# Patient Record
Sex: MALE | Race: WHITE | NOT HISPANIC OR LATINO | Employment: FULL TIME | URBAN - METROPOLITAN AREA
[De-identification: names, ages, dates, MRNs, and addresses within clinical notes are randomized per-mention and may not be internally consistent; named-entity substitution may affect disease eponyms.]

---

## 2017-03-03 ENCOUNTER — APPOINTMENT (EMERGENCY)
Dept: CT IMAGING | Facility: HOSPITAL | Age: 42
End: 2017-03-03
Payer: COMMERCIAL

## 2017-03-03 ENCOUNTER — HOSPITAL ENCOUNTER (EMERGENCY)
Facility: HOSPITAL | Age: 42
Discharge: HOME | End: 2017-03-03
Attending: EMERGENCY MEDICINE | Admitting: EMERGENCY MEDICINE
Payer: COMMERCIAL

## 2017-03-03 VITALS
HEART RATE: 64 BPM | WEIGHT: 278.44 LBS | TEMPERATURE: 98.2 F | OXYGEN SATURATION: 97 % | SYSTOLIC BLOOD PRESSURE: 143 MMHG | RESPIRATION RATE: 16 BRPM | DIASTOLIC BLOOD PRESSURE: 68 MMHG

## 2017-03-03 DIAGNOSIS — K52.9 ACUTE GASTROENTERITIS: Primary | ICD-10-CM

## 2017-03-03 LAB
ALBUMIN SERPL BCP-MCNC: 4.2 G/DL (ref 3.5–5)
ALP SERPL-CCNC: 73 U/L (ref 46–116)
ALT SERPL W P-5'-P-CCNC: 39 U/L (ref 12–78)
ANION GAP SERPL CALCULATED.3IONS-SCNC: 9 MMOL/L (ref 4–13)
AST SERPL W P-5'-P-CCNC: 14 U/L (ref 5–45)
BASOPHILS # BLD AUTO: 0.02 THOUSANDS/ΜL (ref 0–0.1)
BASOPHILS NFR BLD AUTO: 0 % (ref 0–1)
BILIRUB SERPL-MCNC: 0.9 MG/DL (ref 0.2–1)
BUN SERPL-MCNC: 9 MG/DL (ref 5–25)
CALCIUM SERPL-MCNC: 8.8 MG/DL (ref 8.3–10.1)
CHLORIDE SERPL-SCNC: 103 MMOL/L (ref 100–108)
CO2 SERPL-SCNC: 29 MMOL/L (ref 21–32)
CREAT SERPL-MCNC: 0.84 MG/DL (ref 0.6–1.3)
EOSINOPHIL # BLD AUTO: 0.05 THOUSAND/ΜL (ref 0–0.61)
EOSINOPHIL NFR BLD AUTO: 1 % (ref 0–6)
ERYTHROCYTE [DISTWIDTH] IN BLOOD BY AUTOMATED COUNT: 13.1 % (ref 11.6–15.1)
GFR SERPL CREATININE-BSD FRML MDRD: >60 ML/MIN/1.73SQ M
GLUCOSE SERPL-MCNC: 96 MG/DL (ref 65–140)
HCT VFR BLD AUTO: 43 % (ref 36.5–49.3)
HGB BLD-MCNC: 15 G/DL (ref 12–17)
LACTATE SERPL-SCNC: 0.8 MMOL/L (ref 0.5–2)
LIPASE SERPL-CCNC: 154 U/L (ref 73–393)
LYMPHOCYTES # BLD AUTO: 1.79 THOUSANDS/ΜL (ref 0.6–4.47)
LYMPHOCYTES NFR BLD AUTO: 27 % (ref 14–44)
MCH RBC QN AUTO: 30.1 PG (ref 26.8–34.3)
MCHC RBC AUTO-ENTMCNC: 34.9 G/DL (ref 31.4–37.4)
MCV RBC AUTO: 86 FL (ref 82–98)
MONOCYTES # BLD AUTO: 0.65 THOUSAND/ΜL (ref 0.17–1.22)
MONOCYTES NFR BLD AUTO: 10 % (ref 4–12)
NEUTROPHILS # BLD AUTO: 4.07 THOUSANDS/ΜL (ref 1.85–7.62)
NEUTS SEG NFR BLD AUTO: 62 % (ref 43–75)
PLATELET # BLD AUTO: 188 THOUSANDS/UL (ref 149–390)
PMV BLD AUTO: 10.5 FL (ref 8.9–12.7)
POTASSIUM SERPL-SCNC: 3.6 MMOL/L (ref 3.5–5.3)
PROT SERPL-MCNC: 7.3 G/DL (ref 6.4–8.2)
RBC # BLD AUTO: 4.99 MILLION/UL (ref 3.88–5.62)
SODIUM SERPL-SCNC: 141 MMOL/L (ref 136–145)
WBC # BLD AUTO: 6.58 THOUSAND/UL (ref 4.31–10.16)

## 2017-03-03 PROCEDURE — 74177 CT ABD & PELVIS W/CONTRAST: CPT

## 2017-03-03 PROCEDURE — 80053 COMPREHEN METABOLIC PANEL: CPT | Performed by: EMERGENCY MEDICINE

## 2017-03-03 PROCEDURE — 36415 COLL VENOUS BLD VENIPUNCTURE: CPT | Performed by: EMERGENCY MEDICINE

## 2017-03-03 PROCEDURE — 83605 ASSAY OF LACTIC ACID: CPT | Performed by: EMERGENCY MEDICINE

## 2017-03-03 PROCEDURE — 96374 THER/PROPH/DIAG INJ IV PUSH: CPT

## 2017-03-03 PROCEDURE — 96361 HYDRATE IV INFUSION ADD-ON: CPT

## 2017-03-03 PROCEDURE — 83690 ASSAY OF LIPASE: CPT | Performed by: EMERGENCY MEDICINE

## 2017-03-03 PROCEDURE — 85025 COMPLETE CBC W/AUTO DIFF WBC: CPT | Performed by: EMERGENCY MEDICINE

## 2017-03-03 PROCEDURE — 99284 EMERGENCY DEPT VISIT MOD MDM: CPT

## 2017-03-03 RX ORDER — CIPROFLOXACIN 500 MG/1
500 TABLET, FILM COATED ORAL 2 TIMES DAILY
Qty: 10 TABLET | Refills: 0 | Status: SHIPPED | OUTPATIENT
Start: 2017-03-03 | End: 2017-03-08

## 2017-03-03 RX ORDER — ONDANSETRON 4 MG/1
4 TABLET, ORALLY DISINTEGRATING ORAL EVERY 6 HOURS PRN
Qty: 15 TABLET | Refills: 0 | Status: ON HOLD | OUTPATIENT
Start: 2017-03-03 | End: 2019-02-23

## 2017-03-03 RX ORDER — ONDANSETRON 2 MG/ML
4 INJECTION INTRAMUSCULAR; INTRAVENOUS ONCE
Status: COMPLETED | OUTPATIENT
Start: 2017-03-03 | End: 2017-03-03

## 2017-03-03 RX ADMIN — SODIUM CHLORIDE 1000 ML: 0.9 INJECTION, SOLUTION INTRAVENOUS at 16:44

## 2017-03-03 RX ADMIN — ONDANSETRON 4 MG: 2 INJECTION INTRAMUSCULAR; INTRAVENOUS at 16:46

## 2017-03-03 RX ADMIN — IOHEXOL 100 ML: 350 INJECTION, SOLUTION INTRAVENOUS at 17:27

## 2019-02-23 ENCOUNTER — APPOINTMENT (EMERGENCY)
Dept: RADIOLOGY | Facility: HOSPITAL | Age: 44
End: 2019-02-23
Payer: COMMERCIAL

## 2019-02-23 ENCOUNTER — HOSPITAL ENCOUNTER (OUTPATIENT)
Facility: HOSPITAL | Age: 44
Setting detail: OBSERVATION
Discharge: HOME | End: 2019-02-24
Attending: EMERGENCY MEDICINE | Admitting: INTERNAL MEDICINE
Payer: COMMERCIAL

## 2019-02-23 DIAGNOSIS — R06.00 DYSPNEA ON EXERTION: ICD-10-CM

## 2019-02-23 DIAGNOSIS — R07.9 CHEST PAIN, UNSPECIFIED TYPE: Primary | ICD-10-CM

## 2019-02-23 DIAGNOSIS — R07.89 OTHER CHEST PAIN: ICD-10-CM

## 2019-02-23 PROBLEM — E78.5 DYSLIPIDEMIA: Status: ACTIVE | Noted: 2019-02-23

## 2019-02-23 PROBLEM — G89.29 CHRONIC BACK PAIN: Status: ACTIVE | Noted: 2019-02-23

## 2019-02-23 PROBLEM — M54.9 CHRONIC BACK PAIN: Status: ACTIVE | Noted: 2019-02-23

## 2019-02-23 PROBLEM — I10 ESSENTIAL HYPERTENSION: Status: ACTIVE | Noted: 2019-02-23

## 2019-02-23 LAB
ALBUMIN SERPL BCP-MCNC: 4.2 G/DL (ref 3.5–5)
ALP SERPL-CCNC: 47 U/L (ref 46–116)
ALT SERPL W P-5'-P-CCNC: 41 U/L (ref 12–78)
ANION GAP SERPL CALCULATED.3IONS-SCNC: 9 MMOL/L (ref 4–13)
AST SERPL W P-5'-P-CCNC: 11 U/L (ref 5–45)
BASOPHILS # BLD AUTO: 0.04 THOUSANDS/ΜL (ref 0–0.1)
BASOPHILS NFR BLD AUTO: 1 % (ref 0–1)
BILIRUB SERPL-MCNC: 0.3 MG/DL (ref 0.2–1)
BUN SERPL-MCNC: 17 MG/DL (ref 5–25)
CALCIUM SERPL-MCNC: 9.8 MG/DL (ref 8.3–10.1)
CHLORIDE SERPL-SCNC: 105 MMOL/L (ref 100–108)
CO2 SERPL-SCNC: 26 MMOL/L (ref 21–32)
CREAT SERPL-MCNC: 0.87 MG/DL (ref 0.6–1.3)
EOSINOPHIL # BLD AUTO: 0.12 THOUSAND/ΜL (ref 0–0.61)
EOSINOPHIL NFR BLD AUTO: 2 % (ref 0–6)
ERYTHROCYTE [DISTWIDTH] IN BLOOD BY AUTOMATED COUNT: 12.6 % (ref 11.6–15.1)
GFR SERPL CREATININE-BSD FRML MDRD: 106 ML/MIN/1.73SQ M
GLUCOSE SERPL-MCNC: 121 MG/DL (ref 65–140)
HCT VFR BLD AUTO: 39.4 % (ref 36.5–49.3)
HGB BLD-MCNC: 13.6 G/DL (ref 12–17)
IMM GRANULOCYTES # BLD AUTO: 0.01 THOUSAND/UL (ref 0–0.2)
IMM GRANULOCYTES NFR BLD AUTO: 0 % (ref 0–2)
LYMPHOCYTES # BLD AUTO: 2.35 THOUSANDS/ΜL (ref 0.6–4.47)
LYMPHOCYTES NFR BLD AUTO: 37 % (ref 14–44)
MCH RBC QN AUTO: 31.2 PG (ref 26.8–34.3)
MCHC RBC AUTO-ENTMCNC: 34.5 G/DL (ref 31.4–37.4)
MCV RBC AUTO: 90 FL (ref 82–98)
MONOCYTES # BLD AUTO: 0.52 THOUSAND/ΜL (ref 0.17–1.22)
MONOCYTES NFR BLD AUTO: 8 % (ref 4–12)
NEUTROPHILS # BLD AUTO: 3.29 THOUSANDS/ΜL (ref 1.85–7.62)
NEUTS SEG NFR BLD AUTO: 52 % (ref 43–75)
NRBC BLD AUTO-RTO: 0 /100 WBCS
NT-PROBNP SERPL-MCNC: 25 PG/ML
PLATELET # BLD AUTO: 182 THOUSANDS/UL (ref 149–390)
PMV BLD AUTO: 10.5 FL (ref 8.9–12.7)
POTASSIUM SERPL-SCNC: 4 MMOL/L (ref 3.5–5.3)
PROT SERPL-MCNC: 7.3 G/DL (ref 6.4–8.2)
RBC # BLD AUTO: 4.36 MILLION/UL (ref 3.88–5.62)
SODIUM SERPL-SCNC: 140 MMOL/L (ref 136–145)
TROPONIN I SERPL-MCNC: <0.02 NG/ML
WBC # BLD AUTO: 6.33 THOUSAND/UL (ref 4.31–10.16)

## 2019-02-23 PROCEDURE — 93005 ELECTROCARDIOGRAM TRACING: CPT

## 2019-02-23 PROCEDURE — 85025 COMPLETE CBC W/AUTO DIFF WBC: CPT | Performed by: PHYSICIAN ASSISTANT

## 2019-02-23 PROCEDURE — 80053 COMPREHEN METABOLIC PANEL: CPT | Performed by: PHYSICIAN ASSISTANT

## 2019-02-23 PROCEDURE — 36415 COLL VENOUS BLD VENIPUNCTURE: CPT | Performed by: PHYSICIAN ASSISTANT

## 2019-02-23 PROCEDURE — 71046 X-RAY EXAM CHEST 2 VIEWS: CPT

## 2019-02-23 PROCEDURE — 99220 PR INITIAL OBSERVATION CARE/DAY 70 MINUTES: CPT | Performed by: INTERNAL MEDICINE

## 2019-02-23 PROCEDURE — 84484 ASSAY OF TROPONIN QUANT: CPT | Performed by: PHYSICIAN ASSISTANT

## 2019-02-23 PROCEDURE — 99285 EMERGENCY DEPT VISIT HI MDM: CPT

## 2019-02-23 PROCEDURE — 84484 ASSAY OF TROPONIN QUANT: CPT | Performed by: INTERNAL MEDICINE

## 2019-02-23 PROCEDURE — 83880 ASSAY OF NATRIURETIC PEPTIDE: CPT | Performed by: INTERNAL MEDICINE

## 2019-02-23 RX ORDER — SIMETHICONE 80 MG
80 TABLET,CHEWABLE ORAL 4 TIMES DAILY PRN
Status: DISCONTINUED | OUTPATIENT
Start: 2019-02-23 | End: 2019-02-24 | Stop reason: HOSPADM

## 2019-02-23 RX ORDER — MELOXICAM 7.5 MG/1
7.5 TABLET ORAL DAILY
COMMUNITY
End: 2019-02-24 | Stop reason: HOSPADM

## 2019-02-23 RX ORDER — ATORVASTATIN CALCIUM 40 MG/1
40 TABLET, FILM COATED ORAL EVERY EVENING
Status: DISCONTINUED | OUTPATIENT
Start: 2019-02-23 | End: 2019-02-24 | Stop reason: HOSPADM

## 2019-02-23 RX ORDER — LOSARTAN POTASSIUM 50 MG/1
50 TABLET ORAL DAILY
Status: DISCONTINUED | OUTPATIENT
Start: 2019-02-23 | End: 2019-02-23

## 2019-02-23 RX ORDER — ACETAMINOPHEN 325 MG/1
650 TABLET ORAL EVERY 4 HOURS PRN
Status: DISCONTINUED | OUTPATIENT
Start: 2019-02-23 | End: 2019-02-24 | Stop reason: HOSPADM

## 2019-02-23 RX ORDER — FENOFIBRATE 48 MG/1
TABLET, COATED ORAL DAILY
COMMUNITY

## 2019-02-23 RX ORDER — ONDANSETRON 4 MG/1
4 TABLET, ORALLY DISINTEGRATING ORAL EVERY 6 HOURS PRN
Status: DISCONTINUED | OUTPATIENT
Start: 2019-02-23 | End: 2019-02-24 | Stop reason: HOSPADM

## 2019-02-23 RX ORDER — ASPIRIN 81 MG/1
81 TABLET, CHEWABLE ORAL DAILY
Status: DISCONTINUED | OUTPATIENT
Start: 2019-02-24 | End: 2019-02-24 | Stop reason: HOSPADM

## 2019-02-23 RX ORDER — LOSARTAN POTASSIUM 50 MG/1
100 TABLET ORAL DAILY
Status: DISCONTINUED | OUTPATIENT
Start: 2019-02-23 | End: 2019-02-24 | Stop reason: HOSPADM

## 2019-02-23 RX ORDER — TRAMADOL HYDROCHLORIDE 50 MG/1
50 TABLET ORAL EVERY 6 HOURS PRN
Status: DISCONTINUED | OUTPATIENT
Start: 2019-02-23 | End: 2019-02-24 | Stop reason: HOSPADM

## 2019-02-23 RX ORDER — NITROGLYCERIN 0.4 MG/1
0.4 TABLET SUBLINGUAL
Status: DISCONTINUED | OUTPATIENT
Start: 2019-02-23 | End: 2019-02-24 | Stop reason: HOSPADM

## 2019-02-23 RX ORDER — ASPIRIN 81 MG/1
324 TABLET, CHEWABLE ORAL ONCE
Status: COMPLETED | OUTPATIENT
Start: 2019-02-23 | End: 2019-02-23

## 2019-02-23 RX ADMIN — TRAMADOL HYDROCHLORIDE 50 MG: 50 TABLET, COATED ORAL at 12:46

## 2019-02-23 RX ADMIN — TRAMADOL HYDROCHLORIDE 50 MG: 50 TABLET, COATED ORAL at 21:19

## 2019-02-23 RX ADMIN — ASPIRIN 81 MG 324 MG: 81 TABLET ORAL at 09:05

## 2019-02-23 RX ADMIN — ACETAMINOPHEN 650 MG: 325 TABLET, FILM COATED ORAL at 17:08

## 2019-02-23 RX ADMIN — LOSARTAN POTASSIUM 100 MG: 50 TABLET, FILM COATED ORAL at 21:15

## 2019-02-23 RX ADMIN — ATORVASTATIN CALCIUM 40 MG: 40 TABLET, FILM COATED ORAL at 17:08

## 2019-02-23 NOTE — ASSESSMENT & PLAN NOTE
Started on a statin  Will maintain in view of his abnormal lipid panel  Counseled patient regarding diet exercise and weight loss

## 2019-02-23 NOTE — H&P
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 MRN: [id number]  Unit/Bed#: ED 08 Encounter: [id number]  Admitting Physician: Joaquin Hamm MD  PCP: Manny Stoddard MD  Date of Admission:  02/23/19    Assessment/Plan:      * Other chest pain  Assessment & Plan  Symptoms are somewhat atypical   He has few risk factors including him having hypertension also family history and hyperlipidemia  He is also overweight  Would continue his antihypertensive medication  His blood pressure slightly on the higher side  He also takes I believe 1 of the fenofibrate  Also has chronic left shoulder pain  He would be under observation status and ruled out for an acute ischemic event with serial cardiac enzymes and EKG  Would get a stress test   He also has been short of breath  Recently had lesion taken off from his nose  Not sure if he is having some anxiety issues  He is not hypoxic or tachycardic  Low suspicion for pulmonary embolism  If he continues to be symptomatic, may need to have a CT scan of his chest and also an echocardiogram   Would check his BNP  After he is ruled out, would ask for a stress test to be done  Chronic back pain  Assessment & Plan  Chronic back and shoulder pain  Would try some Ultram   He is allergic to codeine  Avoid other NSAIDs at this moment  Dyslipidemia  Assessment & Plan  Started on a statin  Check lipid profile in the morning  Essential hypertension  Assessment & Plan  Blood pressure slightly on the higher side  He is bradycardic although not symptomatic with bradycardia  Heart rate in 50s  Therefore, cannot give him any beta-blocker  Would continue his ARB and adjust the dose as needed        Present on Admission:   Other chest pain   Essential hypertension   Dyslipidemia   Chronic back pain        VTE Prophylaxis: Enoxaparin (Lovenox)  / sequential compression device   Code Status:  Full code  POLST: There is no POLST form on file for this patient (pre-hospital)    Anticipated Length of Stay:  Patient will be admitted on an Observation basis with an anticipated length of stay of  less than 2 midnights  Justification for Hospital Stay:  Chest pain    Total Time for Visit, including Counseling / Coordination of Care: 45 minutes  Greater than 50% of this total time spent on direct patient counseling and coordination of care  Chief Complaint:   Shortness of breath and chest tightness    History of Present Illness:    Marion Anders is a 44 y o  male who presents with shortness of breath with happened at rest yesterday evening and also then again this morning with left-sided chest heaviness  He has been having some pain in his left shoulder-in the back and has had x-rays done and has been told that he has been having some issues with his spine  Also has some pain in his left arm associated with left shoulder pain  Currently he is symptoms free  Not sure if she was having some anxiety attack  Denies any nausea or vomiting  No fever or chills  He had a lesion removed from his nose recently  Has been having some issue with breathing through his nose  No headache  No dizziness  Has chronic back pain issue  Also has had pain in his knees with history of knee surgery      Review of Systems    All systems are reviewed  Positive as per history of presenting illness  Patient answered no to all other questions  Past Medical and Surgical History:     Past Medical History:   Diagnosis Date    Hernia, umbilical     Hyperlipidemia     Hypertension     Spinal stenosis        Past Surgical History:   Procedure Laterality Date    HERNIA REPAIR      KNEE SURGERY      x2       Meds/Allergies:    Prior to Admission medications    Medication Sig Start Date End Date Taking? Authorizing Provider   losartan (COZAAR) 50 mg tablet Take 50 mg by mouth daily      Historical Provider, MD   ondansetron (ZOFRAN-ODT) 4 mg disintegrating tablet Take 1 tablet by mouth every 6 (six) hours as needed for nausea or vomiting 3/3/17   Sofiya Aden MD     Reviewed as above, however, he also takes a fenofibrate    Allergies: Allergies   Allergen Reactions    Codeine Palpitations       Social History:     Marital Status: /Civil Union   Occupation:  Works in a prison   Patient Pre-hospital Living Situation:  With Family  Patient Pre-hospital Level of Mobility:  Independent  Patient Pre-hospital Diet Restrictions:  None  Substance Use History:   Social History     Substance and Sexual Activity   Alcohol Use No     Social History     Tobacco Use   Smoking Status Never Smoker   Smokeless Tobacco Never Used     Social History     Substance and Sexual Activity   Drug Use No       Family History:    Family history is reviewed  Mother about 79years old  She started having some heart issues in her early 46s        Physical Exam      Vitals:   Blood Pressure: 169/88 (02/23/19 1000)  Pulse: 56 (02/23/19 1000)  Temperature: 98 3 °F (36 8 °C) (02/23/19 0905)  Temp Source: Oral (02/23/19 0905)  Respirations: 16 (02/23/19 1000)  Weight - Scale: 128 kg (283 lb) (02/23/19 0905)  SpO2: 97 % (02/23/19 1000)    Vital signs are reviewed as above  Constitutional:  Somewhat overweight male who is lying in stretcher in the ER  Not in any respiratory distress  Eyes: EOM grossly intact  Conjunctivae are normal  Patient has anicteric sclera  HENT: Oropharynx are moist   Has stitch/bend it or on his nose with history of recent lesion removed from the left side of his nose  Neck: Neck is obese and supple  I was not able to visualize any JVD at 90°  There is no significant lymphadenopathy  I also did not notice any significant thyromegaly  Cardiac: I did not hear any rubs or gallop  Patient appears to be in sinus rhythm/bradycardia with heart rate in 50s  Respiratory: Patient not in significant respiratory distress   Air entry in general is fair  GI: Abdomen is obese and soft  It is grossly nontender  Bowel sounds are adequate  I was not able to appreciate any hepatosplenomegaly  Neurologic:  Patient is awake and alert  Neurological examination is grossly intact  No obvious focal neurological deficit noticed  Skin: Skin is warm and dry  Psychiatric: Mood and affect are pleasant  Musculoskeletal  Patient moving all extremities while in stretcher  Has chronic back and shoulder pain and also knee pain  Extremities: Patient has no significant cyanosis, clubbing, or lower extremity edema           Additional Data:     Lab Results: I have personally reviewed pertinent reports  Results from last 7 days   Lab Units 02/23/19  0847   WBC Thousand/uL 6 33   HEMOGLOBIN g/dL 13 6   HEMATOCRIT % 39 4   PLATELETS Thousands/uL 182   NEUTROS PCT % 52   LYMPHS PCT % 37   MONOS PCT % 8   EOS PCT % 2     Results from last 7 days   Lab Units 02/23/19  0847   POTASSIUM mmol/L 4 0   CHLORIDE mmol/L 105   CO2 mmol/L 26   BUN mg/dL 17   CREATININE mg/dL 0 87   CALCIUM mg/dL 9 8   ALK PHOS U/L 47   ALT U/L 41   AST U/L 11           Imaging: I have personally reviewed pertinent reports  No results found  EKG, Pathology, and Other Studies Reviewed on Admission:   · EKG:  Sinus rhythm  Do not see any acute ischemic changes    Allscripts Records Reviewed: No     ** Please Note: Dragon 360 Dictation voice to text software may have been used in the creation of this document   **

## 2019-02-23 NOTE — ASSESSMENT & PLAN NOTE
Chronic back and shoulder pain  Continue with scheduled Tylenol    Avoid NSAIDs in view of his blood pressure

## 2019-02-23 NOTE — PLAN OF CARE
Problem: CARDIOVASCULAR - ADULT  Goal: Maintains optimal cardiac output and hemodynamic stability  Description  INTERVENTIONS:  - Monitor I/O, vital signs and rhythm  - Monitor for S/S and trends of decreased cardiac output i e  bleeding, hypotension  - Administer and titrate ordered vasoactive medications to optimize hemodynamic stability  - Assess quality of pulses, skin color and temperature  - Assess for signs of decreased coronary artery perfusion - ex   Angina  - Instruct patient to report change in severity of symptoms  Outcome: Progressing  Goal: Absence of cardiac dysrhythmias or at baseline rhythm  Description  INTERVENTIONS:  - Continuous cardiac monitoring, monitor vital signs, obtain 12 lead EKG if indicated  - Administer antiarrhythmic and heart rate control medications as ordered  - Monitor electrolytes and administer replacement therapy as ordered  Outcome: Progressing     Problem: SKIN/TISSUE INTEGRITY - ADULT  Goal: Incision(s), wounds(s) or drain site(s) healing without S/S of infection  Description  INTERVENTIONS  - Assess and document risk factors for skin impairment   - Assess and document dressing, incision, wound bed, drain sites and surrounding tissue  - Initiate Nutrition services consult and/or wound management as needed  Outcome: Progressing

## 2019-02-23 NOTE — ASSESSMENT & PLAN NOTE
Symptoms are somewhat atypical   His risk factors include obesity, hypertension, hyperlipidemia  Serial cardiac enzymes were negative for acute coronary syndrome  Await nuclear stress test results  If negative he will be deemed stable for discharge home

## 2019-02-23 NOTE — ED PROVIDER NOTES
History  Chief Complaint   Patient presents with    Shortness of Breath     short of breath with chest tightness, S/P surgery on thursday, had nasal lesion removed, "Im not sure if its anxiety"     46-year-old male presents to the emergency department with complaints of dyspnea on exertion  States that he has noticed increased dyspnea on exertion over the past 2-3 weeks  Notes that last night he was sitting on his couch and had an episode of left-sided chest discomfort/tightness along with difficulty catching his breath  States the episode lasted approximately 2 hours prior to resolving  Patient states that he had a similar episode this morning  He denies radiation of pain into his arm  No nausea or vomiting  Reports he had a previous stress test approximately 2-3 years ago which was normal   Patient states that he has family history coronary artery disease that his mother had MI in her late 45s or early 46s  History provided by:  Patient   used: No        Prior to Admission Medications   Prescriptions Last Dose Informant Patient Reported? Taking?   fenofibrate (TRICOR) 48 mg tablet   Yes Yes   Sig: Take by mouth daily Takes 160 mg once a day   losartan (COZAAR) 50 mg tablet   Yes No   Sig: Take 100 mg by mouth daily    meloxicam (MOBIC) 7 5 mg tablet   Yes Yes   Sig: Take 7 5 mg by mouth daily Unsure of dose      Facility-Administered Medications: None       Past Medical History:   Diagnosis Date    Hernia, umbilical     Hyperlipidemia     Hypertension     Spinal stenosis        Past Surgical History:   Procedure Laterality Date    HERNIA REPAIR      KNEE SURGERY      x2       History reviewed  No pertinent family history  I have reviewed and agree with the history as documented      Social History     Tobacco Use    Smoking status: Never Smoker    Smokeless tobacco: Never Used   Substance Use Topics    Alcohol use: No    Drug use: No        Review of Systems Constitutional: Negative for activity change, appetite change, chills and fever  HENT: Negative for congestion, dental problem, drooling, ear discharge, ear pain, mouth sores, nosebleeds, rhinorrhea, sore throat and trouble swallowing  Eyes: Negative for pain, discharge and itching  Respiratory: Positive for shortness of breath  Negative for cough, chest tightness and wheezing  Cardiovascular: Positive for chest pain  Negative for palpitations  Gastrointestinal: Negative for abdominal pain, blood in stool, constipation, diarrhea, nausea and vomiting  Endocrine: Negative for cold intolerance and heat intolerance  Genitourinary: Negative for difficulty urinating, dysuria, flank pain, frequency and urgency  Skin: Negative for rash and wound  Allergic/Immunologic: Negative for food allergies and immunocompromised state  Neurological: Negative for dizziness, seizures, syncope, weakness, numbness and headaches  Psychiatric/Behavioral: Negative for agitation, behavioral problems and confusion  Physical Exam  Physical Exam   Constitutional: He is oriented to person, place, and time  He appears well-developed and well-nourished  No distress  HENT:   Head: Normocephalic and atraumatic  Right Ear: External ear normal    Left Ear: External ear normal    Mouth/Throat: Oropharynx is clear and moist  No oropharyngeal exudate  Eyes: Conjunctivae are normal    Neck: No JVD present  No tracheal deviation present  Cardiovascular: Normal rate, regular rhythm and normal heart sounds  Exam reveals no gallop and no friction rub  No murmur heard  Pulmonary/Chest: Effort normal and breath sounds normal  No respiratory distress  He has no wheezes  He has no rales  He exhibits no tenderness  Musculoskeletal: Normal range of motion  He exhibits no edema, tenderness or deformity  Lymphadenopathy:     He has no cervical adenopathy  Neurological: He is alert and oriented to person, place, and time  Skin: Skin is warm and dry  No rash noted  He is not diaphoretic  No erythema  Psychiatric: He has a normal mood and affect  Nursing note and vitals reviewed        Vital Signs  ED Triage Vitals   Temperature Pulse Respirations Blood Pressure SpO2   02/23/19 0905 02/23/19 0835 02/23/19 0835 02/23/19 0835 02/23/19 0835   98 3 °F (36 8 °C) 67 18 161/92 97 %      Temp Source Heart Rate Source Patient Position - Orthostatic VS BP Location FiO2 (%)   02/23/19 0905 -- 02/23/19 1000 02/23/19 0835 --   Oral  Sitting Right arm       Pain Score       02/23/19 0835       3           Vitals:    02/23/19 0835 02/23/19 1000 02/23/19 1142 02/23/19 1523   BP: 161/92 169/88 (!) 171/82 156/84   Pulse: 67 56 58 64   Patient Position - Orthostatic VS:  Sitting Sitting Lying       Visual Acuity      ED Medications  Medications   losartan (COZAAR) tablet 50 mg (50 mg Oral Not Given 2/23/19 1152)   ondansetron (ZOFRAN-ODT) dispersible tablet 4 mg (has no administration in time range)   acetaminophen (TYLENOL) tablet 650 mg (650 mg Oral Given 2/23/19 1708)   traMADol (ULTRAM) tablet 50 mg (50 mg Oral Given 2/23/19 1246)   aspirin chewable tablet 81 mg (has no administration in time range)   nitroglycerin (NITROSTAT) SL tablet 0 4 mg (has no administration in time range)   simethicone (MYLICON) chewable tablet 80 mg (has no administration in time range)   atorvastatin (LIPITOR) tablet 40 mg (40 mg Oral Given 2/23/19 1708)   enoxaparin (LOVENOX) subcutaneous injection 40 mg (40 mg Subcutaneous Not Given 2/23/19 1154)   aspirin chewable tablet 324 mg (324 mg Oral Given 2/23/19 0905)       Diagnostic Studies  Results Reviewed     Procedure Component Value Units Date/Time    NT-BNP PRO (BNP for [facility], [facility], [facility], [place], [place], [facility], [facility], [place] campuses) [[id number]]  (Normal) Collected:  02/23/19 0847    Lab Status:  Final result Specimen:  Blood from Arm, Right Updated:  02/23/19 1440     NT-proBNP 25 pg/mL     Troponin I [[id number]]  (Normal) Collected: 02/23/19 0847    Lab Status:  Final result Specimen:  Blood from Arm, Right Updated:  02/23/19 0918     Troponin I <0 02 ng/mL     Comprehensive metabolic panel [[id number]] Collected:  02/23/19 0847    Lab Status:  Final result Specimen:  Blood from Arm, Right Updated:  02/23/19 0915     Sodium 140 mmol/L      Potassium 4 0 mmol/L      Chloride 105 mmol/L      CO2 26 mmol/L      ANION GAP 9 mmol/L      BUN 17 mg/dL      Creatinine 0 87 mg/dL      Glucose 121 mg/dL      Calcium 9 8 mg/dL      AST 11 U/L      ALT 41 U/L      Alkaline Phosphatase 47 U/L      Total Protein 7 3 g/dL      Albumin 4 2 g/dL      Total Bilirubin 0 30 mg/dL      eGFR 106 ml/min/1 73sq m     Narrative:       National Kidney Disease Education Program recommendations are as follows:  GFR calculation is accurate only with a steady state creatinine  Chronic Kidney disease less than 60 ml/min/1 73 sq  meters  Kidney failure less than 15 ml/min/1 73 sq  meters      CBC and differential [[id number]] Collected:  02/23/19 0847    Lab Status:  Final result Specimen:  Blood from Arm, Right Updated:  02/23/19 0853     WBC 6 33 Thousand/uL      RBC 4 36 Million/uL      Hemoglobin 13 6 g/dL      Hematocrit 39 4 %      MCV 90 fL      MCH 31 2 pg      MCHC 34 5 g/dL      RDW 12 6 %      MPV 10 5 fL      Platelets 244 Thousands/uL      nRBC 0 /100 WBCs      Neutrophils Relative 52 %      Immat GRANS % 0 %      Lymphocytes Relative 37 %      Monocytes Relative 8 %      Eosinophils Relative 2 %      Basophils Relative 1 %      Neutrophils Absolute 3 29 Thousands/µL      Immature Grans Absolute 0 01 Thousand/uL      Lymphocytes Absolute 2 35 Thousands/µL      Monocytes Absolute 0 52 Thousand/µL      Eosinophils Absolute 0 12 Thousand/µL      Basophils Absolute 0 04 Thousands/µL                  XR chest 2 views   ED Interpretation by Karla Church PA-C (02/23 5371)   Clear lungs      Final Result by Koki White MD (02/23 9409)      No acute cardiopulmonary disease              Workstation performed: [id number]                    Procedures  ECG 12 Lead Documentation  Date/Time: 2/23/2019 11:03 AM  Performed by: Gaston Avina PA-C  Authorized by: Gaston Avina PA-C     Indications / Diagnosis:  Dyspnea on exertion  Patient location:  ED  Previous ECG:     Previous ECG:  Compared to current    Comparison ECG info:  9/13/16    Similarity:  No change  Interpretation:     Interpretation: normal    Rate:     ECG rate:  68    ECG rate assessment: normal    Rhythm:     Rhythm: sinus rhythm    Ectopy:     Ectopy: none    QRS:     QRS axis:  Normal  Conduction:     Conduction: normal    ST segments:     ST segments:  Normal  T waves:     T waves: normal             Phone Contacts  ECG 12 Lead Documentation  Date/Time: 2/23/2019 11:03 AM  Performed by: Gaston Avina PA-C  Authorized by: Gaston Avina PA-C     Indications / Diagnosis:  Dyspnea on exertion  Patient location:  ED  Previous ECG:     Previous ECG:  Compared to current    Comparison ECG info:  9/13/16    Similarity:  No change  Interpretation:     Interpretation: normal    Rate:     ECG rate:  68    ECG rate assessment: normal    Rhythm:     Rhythm: sinus rhythm    Ectopy:     Ectopy: none    QRS:     QRS axis:  Normal  Conduction:     Conduction: normal    ST segments:     ST segments:  Normal  T waves:     T waves: normal          ED Course         HEART Risk Score      Most Recent Value   History  2 Filed at: 02/23/2019 1000   ECG  0 Filed at: 02/23/2019 1000   Age  0 Filed at: 02/23/2019 1000   Risk Factors  1 Filed at: 02/23/2019 1000   Troponin  0 Filed at: 02/23/2019 1000   Heart Score Risk Calculator   History  2 Filed at: 02/23/2019 1000   ECG  0 Filed at: 02/23/2019 1000   Age  0 Filed at: 02/23/2019 1000   Risk Factors  1 Filed at: 02/23/2019 1000   Troponin  0 Filed at: 02/23/2019 1000   HEART Score  3 Filed at: 02/23/2019 1000   HEART Score  3 Filed at: 02/23/2019 1000 MDM  Number of Diagnoses or Management Options  Chest pain, unspecified type:   Dyspnea on exertion:   Diagnosis management comments: Differential diagnosis includes but not limited to:   Coronary artery disease, angina       Amount and/or Complexity of Data Reviewed  Clinical lab tests: ordered and reviewed  Tests in the radiology section of CPT®: ordered and reviewed  Independent visualization of images, tracings, or specimens: yes        Disposition  Final diagnoses:   Chest pain, unspecified type   Dyspnea on exertion     Time reflects when diagnosis was documented in both MDM as applicable and the Disposition within this note     Time User Action Codes Description Comment    2/23/2019 10:08 AM Byron Baker Add [R07 9] Chest pain, unspecified type     2/23/2019 10:08 AM Byron Baker Add [R06 09] Dyspnea on exertion       ED Disposition     ED Disposition Condition Date/Time Comment    Admit Stable Sat Feb 23, 2019 10:07 AM Case was discussed with RAFFI and the patient's admission status was agreed to be Admission Status: observation status to the service of Dr Dayanara Briceño          Follow-up Information    None         Current Discharge Medication List      CONTINUE these medications which have NOT CHANGED    Details   fenofibrate (TRICOR) 48 mg tablet Take by mouth daily Takes 160 mg once a day      meloxicam (MOBIC) 7 5 mg tablet Take 7 5 mg by mouth daily Unsure of dose      losartan (COZAAR) 50 mg tablet Take 100 mg by mouth daily          STOP taking these medications       ondansetron (ZOFRAN-ODT) 4 mg disintegrating tablet Comments:   Reason for Stopping:             No discharge procedures on file      ED Provider  Electronically Signed by           Deysi Cisse PA-C  02/23/19 4314

## 2019-02-24 ENCOUNTER — APPOINTMENT (OUTPATIENT)
Dept: NON INVASIVE DIAGNOSTICS | Facility: HOSPITAL | Age: 44
End: 2019-02-24
Payer: COMMERCIAL

## 2019-02-24 ENCOUNTER — APPOINTMENT (OUTPATIENT)
Dept: NUCLEAR MEDICINE | Facility: HOSPITAL | Age: 44
End: 2019-02-24
Payer: COMMERCIAL

## 2019-02-24 VITALS
DIASTOLIC BLOOD PRESSURE: 84 MMHG | SYSTOLIC BLOOD PRESSURE: 158 MMHG | OXYGEN SATURATION: 97 % | RESPIRATION RATE: 16 BRPM | HEIGHT: 74 IN | BODY MASS INDEX: 36.13 KG/M2 | WEIGHT: 281.53 LBS | TEMPERATURE: 97.3 F | HEART RATE: 64 BPM

## 2019-02-24 PROBLEM — R07.89 OTHER CHEST PAIN: Status: RESOLVED | Noted: 2019-02-23 | Resolved: 2019-02-24

## 2019-02-24 LAB
CHOLEST SERPL-MCNC: 184 MG/DL (ref 50–200)
HDLC SERPL-MCNC: 27 MG/DL (ref 40–60)
LDLC SERPL CALC-MCNC: 79 MG/DL (ref 0–100)
NONHDLC SERPL-MCNC: 157 MG/DL
TRIGL SERPL-MCNC: 392 MG/DL

## 2019-02-24 PROCEDURE — 80061 LIPID PANEL: CPT | Performed by: INTERNAL MEDICINE

## 2019-02-24 PROCEDURE — 93017 CV STRESS TEST TRACING ONLY: CPT

## 2019-02-24 PROCEDURE — 93018 CV STRESS TEST I&R ONLY: CPT | Performed by: INTERNAL MEDICINE

## 2019-02-24 PROCEDURE — 78452 HT MUSCLE IMAGE SPECT MULT: CPT

## 2019-02-24 PROCEDURE — A9502 TC99M TETROFOSMIN: HCPCS

## 2019-02-24 PROCEDURE — 99217 PR OBSERVATION CARE DISCHARGE MANAGEMENT: CPT | Performed by: INTERNAL MEDICINE

## 2019-02-24 PROCEDURE — 93016 CV STRESS TEST SUPVJ ONLY: CPT | Performed by: INTERNAL MEDICINE

## 2019-02-24 PROCEDURE — 78452 HT MUSCLE IMAGE SPECT MULT: CPT | Performed by: INTERNAL MEDICINE

## 2019-02-24 RX ORDER — ACETAMINOPHEN 325 MG/1
650 TABLET ORAL EVERY 4 HOURS PRN
Qty: 30 TABLET | Refills: 0 | Status: SHIPPED | OUTPATIENT
Start: 2019-02-24

## 2019-02-24 RX ORDER — ATORVASTATIN CALCIUM 40 MG/1
20 TABLET, FILM COATED ORAL EVERY EVENING
Qty: 30 TABLET | Refills: 0 | Status: SHIPPED | OUTPATIENT
Start: 2019-02-24

## 2019-02-24 RX ADMIN — ACETAMINOPHEN 650 MG: 325 TABLET, FILM COATED ORAL at 02:02

## 2019-02-24 RX ADMIN — ACETAMINOPHEN 650 MG: 325 TABLET, FILM COATED ORAL at 07:45

## 2019-02-24 RX ADMIN — ASPIRIN 81 MG 81 MG: 81 TABLET ORAL at 10:43

## 2019-02-24 RX ADMIN — ATORVASTATIN CALCIUM 40 MG: 40 TABLET, FILM COATED ORAL at 16:12

## 2019-02-24 RX ADMIN — REGADENOSON 0.4 MG: 0.08 INJECTION, SOLUTION INTRAVENOUS at 09:04

## 2019-02-24 RX ADMIN — ACETAMINOPHEN 650 MG: 325 TABLET, FILM COATED ORAL at 16:12

## 2019-02-24 RX ADMIN — TRAMADOL HYDROCHLORIDE 50 MG: 50 TABLET, COATED ORAL at 07:45

## 2019-02-24 NOTE — DISCHARGE SUMMARY
Discharge- [facility] 1975, 44 y o  male MRN: [id number]    Unit/Bed#: -01 Encounter: [id number]    Primary Care Provider: Manny Stoddard MD   Date and time admitted to hospital: 2/23/2019  8:30 AM        Chronic back pain  Assessment & Plan  Chronic back and shoulder pain  Continue with scheduled Tylenol  Avoid NSAIDs in view of his blood pressure    Dyslipidemia  Assessment & Plan  Started on a statin  Will maintain in view of his abnormal lipid panel  Counseled patient regarding diet exercise and weight loss  Essential hypertension  Assessment & Plan  Continue with current outpatient dose of antihypertensive medication    * Other chest pain  Assessment & Plan  Symptoms are somewhat atypical   His risk factors include obesity, hypertension, hyperlipidemia  Serial cardiac enzymes were negative for acute coronary syndrome  Await nuclear stress test results  If negative he will be deemed stable for discharge home  Discharging Physician / Practitioner: Alireza Cadena MD  PCP: Manny Stoddard MD  Admission Date:   Admission Orders (From admission, onward)    Ordered        02/23/19 1010  Place in Observation (expected length of stay for this patient is less than two midnights)  Once     Order ID Start Status   [id number] 02/23/19 1009 Completed              Discharge Date: 02/24/19    Resolved Problems  Date Reviewed: 2/23/2019    None          Consultations During Hospital Stay:  · None    Procedures Performed:   · Nuclear stress test    Incidental Findings:   · None    Test Results Pending at Discharge (will require follow up): · None     Outpatient Tests Requested:  · None    Complications:  None  Reason for Admission:  Chest  Hospital Course:     [facility] is a 44 y o  male patient who originally presented to the hospital on 2/23/2019 due to nonspecific chest pain  Revision defect with good admitted under observation    Serial cardiac enzymes were negative for acute coronary syndrome he underwent a nuclear stress test which was negative for ischemia  He was started on a statin because of his hypertriglyceridemia  Lifestyle modification was recommended  He was deemed stable for discharge home    Please see above list of diagnoses and related plan for additional information  Condition at Discharge: stable     Discharge Day Visit / Exam:     Subjective:  Patient denies any further episode of chest discomfort  Vitals: Blood Pressure: 145/77 (02/23/19 2207)  Pulse: 72 (02/23/19 2207)  Temperature: 98 5 °F (36 9 °C) (02/23/19 2207)  Temp Source: Oral (02/23/19 2207)  Respirations: 16 (02/23/19 2207)  Height: 6' 2" (188 cm) (02/23/19 1142)  Weight - Scale: 128 kg (281 lb 8 4 oz) (02/23/19 1142)  SpO2: 97 % (02/23/19 2207)  Exam:   Physical Exam   Constitutional: He is oriented to person, place, and time  No distress  HENT:   Head: Normocephalic and atraumatic  Eyes: Pupils are equal, round, and reactive to light  EOM are normal    Neck: Normal range of motion  Neck supple  Cardiovascular: Normal rate and regular rhythm  Pulmonary/Chest: Effort normal    Abdominal: Soft  Musculoskeletal: He exhibits no edema  Neurological: He is alert and oriented to person, place, and time  Skin: Skin is warm  He is not diaphoretic  Discussion with Family:  Discussed with patient at bedside  Discharge instructions/Information to patient and family:   See after visit summary for information provided to patient and family  Provisions for Follow-Up Care:  See after visit summary for information related to follow-up care and any pertinent home health orders  Disposition:     Home    For Discharges to [facility] SNF:   · Not Applicable to this Patient - Not Applicable to this Patient    Planned Readmission: No     Discharge Statement:  I spent 35 minutes discharging the patient  This time was spent on the day of discharge   I had direct contact with the patient on the day of discharge  Greater than 50% of the total time was spent examining patient, answering all patient questions, arranging and discussing plan of care with patient as well as directly providing post-discharge instructions  Additional time then spent on discharge activities  Discharge Medications:  See after visit summary for reconciled discharge medications provided to patient and family        ** Please Note: This note has been constructed using a voice recognition system **

## 2019-02-24 NOTE — UTILIZATION REVIEW
Initial Clinical Review    Admission: Date/Time/Statement:  2/23/2019  1009 OBSERVATION   Orders Placed This Encounter   Procedures    Place in Observation (expected length of stay for this patient is less than two midnights)     Standing Status:   Standing     Number of Occurrences:   1     Order Specific Question:   Admitting Physician     Answer:   Jesi Sanchez [[id number]]     Order Specific Question:   Level of Care     Answer:   Med Surg [[id number]]     ED: Date/Time/Mode of Arrival:   ED Arrival Information     Expected Arrival Acuity Means of Arrival Escorted By Service Admission Type    - 2/23/2019 08:22 Urgent Walk-In Self Hospitalist Urgent    Arrival Complaint    SOB        Chief Complaint:   Chief Complaint   Patient presents with    Shortness of Breath     short of breath with chest tightness, S/P surgery on thursday, had nasal lesion removed, "Im not sure if its anxiety"     Assessment/Plan: This is a 44year old male from home with past medical history of hypertension, dyslipidemia, chronic back pain, family history of CAD with mother who had MI in 45s- 55s who presented to ED with increasing dyspnea on exertion the last 2 to 3 weeks and now associated with 2 episodes of left sided chest discomfort  In the ED patient was hypertensive and had heart risk score of 3  Monitor bradycardia in 50s     Patient is admitted to observation with chest pain R/O ACS, plan includes:  Trend troponin and stress test  Hold beta blocker with bradycardia, continue arb    ED Vital Signs:   ED Triage Vitals   Temperature Pulse Respirations Blood Pressure SpO2   02/23/19 0905 02/23/19 0835 02/23/19 0835 02/23/19 0835 02/23/19 0835   98 3 °F (36 8 °C) 67 18 161/92 97 %      Temp Source Heart Rate Source Patient Position - Orthostatic VS BP Location FiO2 (%)   02/23/19 0905 -- 02/23/19 1000 02/23/19 0835 --   Oral  Sitting Right arm       Pain Score       02/23/19 0835       3        Wt Readings from Last 1 Encounters:   02/23/19 128 kg (281 lb 8 4 oz)     Vital Signs (abnormal):   02/23/19 1142  97 6 °F (36 4 °C)  58  18  171/82Abnormal   99 %  None (Room air)        Pertinent Labs/Diagnostic Test Results:   Serial troponin negative    EKG - sinus  ED Treatment:   Medication Administration from 02/23/2019 0822 to 02/23/2019 1115       Date/Time Order Dose Route Action Comments     02/23/2019 0905 aspirin chewable tablet 324 mg 324 mg Oral Given         Past Medical/Surgical History:   Past Medical History:   Diagnosis Date    Hernia, umbilical     Hyperlipidemia     Hypertension     Spinal stenosis      Admitting Diagnosis: SOB (shortness of breath) [R06 02]  Dyspnea on exertion [R06 09]  Chest pain, unspecified type [R07 9]  Age/Sex: 44 y o  male    Admission Orders:  2/23/2019  1009 OBSERVATION  Scheduled Meds:   Current Facility-Administered Medications:  acetaminophen 650 mg Oral Q4H PRN   aspirin 81 mg Oral Daily   atorvastatin 40 mg Oral QPM   enoxaparin 40 mg Subcutaneous Q24H GUSTAVO   losartan 100 mg Oral Daily   nitroglycerin 0 4 mg Sublingual Q5 Min PRN   ondansetron 4 mg Oral Q6H PRN   simethicone 80 mg Oral 4x Daily PRN   traMADol 50 mg Oral Q6H PRN     Continuous Infusions:    PRN Meds:   Acetaminophen - used x 2      traMADol - used x 2  OTHER ORDERS: telemetry  NM myocardial perfusion spect      Network Utilization Review Department  Phone: [phone]; Fax [phone]  [email or web address]  ATTENTION: Please call with any questions or concerns to [phone]  and carefully listen to the prompts so that you are directed to the right person  Send all requests for admission clinical reviews, approved or denied determinations and any other requests to fax [phone]   All voicemails are confidential

## 2019-02-25 LAB
ARRHY DURING EX: NORMAL
ATRIAL RATE: 61 BPM
ATRIAL RATE: 64 BPM
ATRIAL RATE: 71 BPM
CHEST PAIN STATEMENT: NORMAL
MAX DIASTOLIC BP: 78 MMHG
MAX HEART RATE: 95 BPM
MAX PREDICTED HEART RATE: 178 BPM
MAX. SYSTOLIC BP: 152 MMHG
P AXIS: 42 DEGREES
P AXIS: 53 DEGREES
P AXIS: 57 DEGREES
PR INTERVAL: 188 MS
PR INTERVAL: 200 MS
PR INTERVAL: 218 MS
PROTOCOL NAME: NORMAL
QRS AXIS: 20 DEGREES
QRS AXIS: 56 DEGREES
QRS AXIS: 59 DEGREES
QRSD INTERVAL: 102 MS
QRSD INTERVAL: 108 MS
QRSD INTERVAL: 94 MS
QT INTERVAL: 366 MS
QT INTERVAL: 378 MS
QT INTERVAL: 380 MS
QTC INTERVAL: 368 MS
QTC INTERVAL: 389 MS
QTC INTERVAL: 405 MS
REASON FOR TERMINATION: NORMAL
T WAVE AXIS: 31 DEGREES
T WAVE AXIS: 35 DEGREES
T WAVE AXIS: 59 DEGREES
TARGET HR FORMULA: NORMAL
TIME IN EXERCISE PHASE: NORMAL
VENTRICULAR RATE: 61 BPM
VENTRICULAR RATE: 64 BPM
VENTRICULAR RATE: 68 BPM

## 2019-02-25 PROCEDURE — 93010 ELECTROCARDIOGRAM REPORT: CPT | Performed by: INTERNAL MEDICINE

## 2019-04-02 ENCOUNTER — HOSPITAL ENCOUNTER (EMERGENCY)
Facility: HOSPITAL | Age: 44
Discharge: HOME | End: 2019-04-02
Attending: EMERGENCY MEDICINE | Admitting: EMERGENCY MEDICINE
Payer: COMMERCIAL

## 2019-04-02 VITALS
SYSTOLIC BLOOD PRESSURE: 200 MMHG | HEART RATE: 67 BPM | TEMPERATURE: 98.1 F | OXYGEN SATURATION: 97 % | RESPIRATION RATE: 16 BRPM | BODY MASS INDEX: 36 KG/M2 | DIASTOLIC BLOOD PRESSURE: 98 MMHG | WEIGHT: 280.43 LBS

## 2019-04-02 DIAGNOSIS — Z20.3 RABIES EXPOSURE: Primary | ICD-10-CM

## 2019-04-02 DIAGNOSIS — Z20.3 NEED FOR POST EXPOSURE PROPHYLAXIS FOR RABIES: ICD-10-CM

## 2019-04-02 PROCEDURE — 90471 IMMUNIZATION ADMIN: CPT

## 2019-04-02 PROCEDURE — 96372 THER/PROPH/DIAG INJ SC/IM: CPT

## 2019-04-02 PROCEDURE — 90675 RABIES VACCINE IM: CPT | Performed by: EMERGENCY MEDICINE

## 2019-04-02 PROCEDURE — 99283 EMERGENCY DEPT VISIT LOW MDM: CPT

## 2019-04-02 PROCEDURE — 99284 EMERGENCY DEPT VISIT MOD MDM: CPT | Performed by: EMERGENCY MEDICINE

## 2019-04-02 PROCEDURE — 90375 RABIES IG IM/SC: CPT | Performed by: EMERGENCY MEDICINE

## 2019-04-02 RX ADMIN — RABIES VIRUS STRAIN PM-1503-3M ANTIGEN (PROPIOLACTONE INACTIVATED) AND WATER 1 ML: KIT at 10:32

## 2019-04-02 RX ADMIN — RABIES IMMUNE GLOBULIN (HUMAN) 2550 UNITS: 300 INJECTION, SOLUTION INFILTRATION; INTRAMUSCULAR at 10:36

## 2019-04-04 ENCOUNTER — CLINICAL SUPPORT (OUTPATIENT)
Dept: URGENT CARE | Facility: CLINIC | Age: 44
End: 2019-04-04
Payer: COMMERCIAL

## 2019-04-04 VITALS — TEMPERATURE: 97.6 F

## 2019-04-04 DIAGNOSIS — Z20.3 EXPOSURE TO RABIES: Primary | ICD-10-CM

## 2019-04-04 PROCEDURE — 90675 RABIES VACCINE IM: CPT

## 2019-04-04 RX ORDER — DIPHENOXYLATE HYDROCHLORIDE AND ATROPINE SULFATE 2.5; .025 MG/1; MG/1
1 TABLET ORAL
COMMUNITY

## 2019-04-04 RX ORDER — BUSPIRONE HYDROCHLORIDE 7.5 MG/1
7.5 TABLET ORAL 2 TIMES DAILY
Refills: 3 | COMMUNITY
Start: 2019-02-27

## 2019-04-08 ENCOUNTER — CLINICAL SUPPORT (OUTPATIENT)
Dept: URGENT CARE | Facility: CLINIC | Age: 44
End: 2019-04-08
Payer: COMMERCIAL

## 2019-04-08 VITALS — TEMPERATURE: 97.4 F

## 2019-04-08 DIAGNOSIS — Z20.3 EXPOSURE TO RABIES: Primary | ICD-10-CM

## 2019-04-08 PROCEDURE — 90675 RABIES VACCINE IM: CPT

## 2019-04-15 ENCOUNTER — CLINICAL SUPPORT (OUTPATIENT)
Dept: URGENT CARE | Facility: CLINIC | Age: 44
End: 2019-04-15
Payer: COMMERCIAL

## 2019-04-15 VITALS — TEMPERATURE: 97.6 F

## 2019-04-15 DIAGNOSIS — Z20.3 EXPOSURE TO RABIES: Primary | ICD-10-CM

## 2019-04-15 PROCEDURE — 90675 RABIES VACCINE IM: CPT

## 2019-04-15 PROCEDURE — 90471 IMMUNIZATION ADMIN: CPT

## 2019-04-15 PROCEDURE — 90472 IMMUNIZATION ADMIN EACH ADD: CPT

## 2019-07-07 ENCOUNTER — HOSPITAL ENCOUNTER (EMERGENCY)
Facility: HOSPITAL | Age: 44
Discharge: HOME | End: 2019-07-08
Attending: EMERGENCY MEDICINE
Payer: COMMERCIAL

## 2019-07-07 DIAGNOSIS — N20.1 URETEROLITHIASIS: Primary | ICD-10-CM

## 2019-07-07 PROCEDURE — 99284 EMERGENCY DEPT VISIT MOD MDM: CPT

## 2019-07-07 RX ORDER — ONDANSETRON 2 MG/ML
4 INJECTION INTRAMUSCULAR; INTRAVENOUS ONCE
Status: COMPLETED | OUTPATIENT
Start: 2019-07-08 | End: 2019-07-08

## 2019-07-08 ENCOUNTER — APPOINTMENT (EMERGENCY)
Dept: CT IMAGING | Facility: HOSPITAL | Age: 44
End: 2019-07-08
Payer: COMMERCIAL

## 2019-07-08 VITALS
HEART RATE: 68 BPM | BODY MASS INDEX: 36.75 KG/M2 | RESPIRATION RATE: 18 BRPM | TEMPERATURE: 97.8 F | OXYGEN SATURATION: 96 % | SYSTOLIC BLOOD PRESSURE: 147 MMHG | WEIGHT: 286.2 LBS | DIASTOLIC BLOOD PRESSURE: 67 MMHG

## 2019-07-08 LAB
ALBUMIN SERPL BCP-MCNC: 4 G/DL (ref 3.5–5)
ALP SERPL-CCNC: 54 U/L (ref 46–116)
ALT SERPL W P-5'-P-CCNC: 38 U/L (ref 12–78)
ANION GAP SERPL CALCULATED.3IONS-SCNC: 10 MMOL/L (ref 4–13)
AST SERPL W P-5'-P-CCNC: 22 U/L (ref 5–45)
BACTERIA UR QL AUTO: ABNORMAL /HPF
BASOPHILS # BLD AUTO: 0.04 THOUSANDS/ΜL (ref 0–0.1)
BASOPHILS NFR BLD AUTO: 0 % (ref 0–1)
BILIRUB SERPL-MCNC: 0.3 MG/DL (ref 0.2–1)
BILIRUB UR QL STRIP: NEGATIVE
BILIRUB UR QL STRIP: NEGATIVE
BUN SERPL-MCNC: 19 MG/DL (ref 5–25)
CALCIUM SERPL-MCNC: 9.1 MG/DL (ref 8.3–10.1)
CHLORIDE SERPL-SCNC: 105 MMOL/L (ref 100–108)
CLARITY UR: CLEAR
CLARITY UR: CLEAR
CO2 SERPL-SCNC: 27 MMOL/L (ref 21–32)
COLOR UR: YELLOW
COLOR UR: YELLOW
CREAT SERPL-MCNC: 1.37 MG/DL (ref 0.6–1.3)
EOSINOPHIL # BLD AUTO: 0.33 THOUSAND/ΜL (ref 0–0.61)
EOSINOPHIL NFR BLD AUTO: 3 % (ref 0–6)
ERYTHROCYTE [DISTWIDTH] IN BLOOD BY AUTOMATED COUNT: 12.7 % (ref 11.6–15.1)
GFR SERPL CREATININE-BSD FRML MDRD: 63 ML/MIN/1.73SQ M
GLUCOSE SERPL-MCNC: 129 MG/DL (ref 65–140)
GLUCOSE UR STRIP-MCNC: NEGATIVE MG/DL
GLUCOSE UR STRIP-MCNC: NEGATIVE MG/DL
HCT VFR BLD AUTO: 39 % (ref 36.5–49.3)
HGB BLD-MCNC: 13.1 G/DL (ref 12–17)
HGB UR QL STRIP.AUTO: ABNORMAL
HGB UR QL STRIP.AUTO: ABNORMAL
IMM GRANULOCYTES # BLD AUTO: 0.12 THOUSAND/UL (ref 0–0.2)
IMM GRANULOCYTES NFR BLD AUTO: 1 % (ref 0–2)
KETONES UR STRIP-MCNC: NEGATIVE MG/DL
KETONES UR STRIP-MCNC: NEGATIVE MG/DL
LEUKOCYTE ESTERASE UR QL STRIP: NEGATIVE
LEUKOCYTE ESTERASE UR QL STRIP: NEGATIVE
LYMPHOCYTES # BLD AUTO: 2.31 THOUSANDS/ΜL (ref 0.6–4.47)
LYMPHOCYTES NFR BLD AUTO: 23 % (ref 14–44)
MCH RBC QN AUTO: 30.8 PG (ref 26.8–34.3)
MCHC RBC AUTO-ENTMCNC: 33.6 G/DL (ref 31.4–37.4)
MCV RBC AUTO: 92 FL (ref 82–98)
MONOCYTES # BLD AUTO: 0.81 THOUSAND/ΜL (ref 0.17–1.22)
MONOCYTES NFR BLD AUTO: 8 % (ref 4–12)
NEUTROPHILS # BLD AUTO: 6.29 THOUSANDS/ΜL (ref 1.85–7.62)
NEUTS SEG NFR BLD AUTO: 65 % (ref 43–75)
NITRITE UR QL STRIP: NEGATIVE
NITRITE UR QL STRIP: NEGATIVE
NON-SQ EPI CELLS URNS QL MICRO: ABNORMAL /HPF
NRBC BLD AUTO-RTO: 0 /100 WBCS
PH UR STRIP.AUTO: 5.5 [PH]
PH UR STRIP.AUTO: 6 [PH] (ref 4.5–8)
PLATELET # BLD AUTO: 180 THOUSANDS/UL (ref 149–390)
PMV BLD AUTO: 10.5 FL (ref 8.9–12.7)
POTASSIUM SERPL-SCNC: 3.7 MMOL/L (ref 3.5–5.3)
PROT SERPL-MCNC: 7 G/DL (ref 6.4–8.2)
PROT UR STRIP-MCNC: NEGATIVE MG/DL
PROT UR STRIP-MCNC: NEGATIVE MG/DL
RBC # BLD AUTO: 4.25 MILLION/UL (ref 3.88–5.62)
RBC #/AREA URNS AUTO: ABNORMAL /HPF
SODIUM SERPL-SCNC: 142 MMOL/L (ref 136–145)
SP GR UR STRIP.AUTO: 1.02 (ref 1–1.03)
SP GR UR STRIP.AUTO: 1.02 (ref 1–1.03)
UROBILINOGEN UR QL STRIP.AUTO: 0.2 E.U./DL
UROBILINOGEN UR QL STRIP.AUTO: 0.2 E.U./DL
WBC # BLD AUTO: 9.9 THOUSAND/UL (ref 4.31–10.16)
WBC #/AREA URNS AUTO: ABNORMAL /HPF

## 2019-07-08 PROCEDURE — 85025 COMPLETE CBC W/AUTO DIFF WBC: CPT | Performed by: PHYSICIAN ASSISTANT

## 2019-07-08 PROCEDURE — 36415 COLL VENOUS BLD VENIPUNCTURE: CPT | Performed by: PHYSICIAN ASSISTANT

## 2019-07-08 PROCEDURE — 80053 COMPREHEN METABOLIC PANEL: CPT | Performed by: PHYSICIAN ASSISTANT

## 2019-07-08 PROCEDURE — 96374 THER/PROPH/DIAG INJ IV PUSH: CPT

## 2019-07-08 PROCEDURE — 96361 HYDRATE IV INFUSION ADD-ON: CPT

## 2019-07-08 PROCEDURE — 81001 URINALYSIS AUTO W/SCOPE: CPT | Performed by: EMERGENCY MEDICINE

## 2019-07-08 PROCEDURE — 74176 CT ABD & PELVIS W/O CONTRAST: CPT

## 2019-07-08 PROCEDURE — 99283 EMERGENCY DEPT VISIT LOW MDM: CPT | Performed by: PHYSICIAN ASSISTANT

## 2019-07-08 PROCEDURE — 96375 TX/PRO/DX INJ NEW DRUG ADDON: CPT

## 2019-07-08 PROCEDURE — 96376 TX/PRO/DX INJ SAME DRUG ADON: CPT

## 2019-07-08 RX ORDER — ONDANSETRON 2 MG/ML
INJECTION INTRAMUSCULAR; INTRAVENOUS
Status: COMPLETED
Start: 2019-07-08 | End: 2019-07-08

## 2019-07-08 RX ORDER — OXYCODONE HYDROCHLORIDE 5 MG/1
5 TABLET ORAL EVERY 4 HOURS PRN
Qty: 24 TABLET | Refills: 0 | Status: SHIPPED | OUTPATIENT
Start: 2019-07-08 | End: 2019-07-12

## 2019-07-08 RX ORDER — OXYCODONE HYDROCHLORIDE 5 MG/1
5 TABLET ORAL ONCE
Status: COMPLETED | OUTPATIENT
Start: 2019-07-08 | End: 2019-07-08

## 2019-07-08 RX ORDER — TAMSULOSIN HYDROCHLORIDE 0.4 MG/1
0.4 CAPSULE ORAL ONCE
Status: COMPLETED | OUTPATIENT
Start: 2019-07-08 | End: 2019-07-08

## 2019-07-08 RX ORDER — ONDANSETRON 2 MG/ML
4 INJECTION INTRAMUSCULAR; INTRAVENOUS ONCE
Status: COMPLETED | OUTPATIENT
Start: 2019-07-08 | End: 2019-07-08

## 2019-07-08 RX ORDER — ACETAMINOPHEN 325 MG/1
650 TABLET ORAL ONCE
Status: COMPLETED | OUTPATIENT
Start: 2019-07-08 | End: 2019-07-08

## 2019-07-08 RX ORDER — TAMSULOSIN HYDROCHLORIDE 0.4 MG/1
0.4 CAPSULE ORAL
Qty: 10 CAPSULE | Refills: 0 | Status: SHIPPED | OUTPATIENT
Start: 2019-07-08 | End: 2019-07-18

## 2019-07-08 RX ORDER — KETOROLAC TROMETHAMINE 30 MG/ML
15 INJECTION, SOLUTION INTRAMUSCULAR; INTRAVENOUS ONCE
Status: COMPLETED | OUTPATIENT
Start: 2019-07-08 | End: 2019-07-08

## 2019-07-08 RX ADMIN — SODIUM CHLORIDE 1000 ML: 0.9 INJECTION, SOLUTION INTRAVENOUS at 00:25

## 2019-07-08 RX ADMIN — TAMSULOSIN HYDROCHLORIDE 0.4 MG: 0.4 CAPSULE ORAL at 02:32

## 2019-07-08 RX ADMIN — OXYCODONE HYDROCHLORIDE 5 MG: 5 TABLET ORAL at 02:47

## 2019-07-08 RX ADMIN — MORPHINE SULFATE 2 MG: 2 INJECTION, SOLUTION INTRAMUSCULAR; INTRAVENOUS at 01:30

## 2019-07-08 RX ADMIN — KETOROLAC TROMETHAMINE 15 MG: 30 INJECTION, SOLUTION INTRAMUSCULAR at 02:35

## 2019-07-08 RX ADMIN — ONDANSETRON 4 MG: 2 INJECTION INTRAMUSCULAR; INTRAVENOUS at 01:30

## 2019-07-08 RX ADMIN — ONDANSETRON 4 MG: 2 INJECTION INTRAMUSCULAR; INTRAVENOUS at 00:24

## 2019-07-08 RX ADMIN — ACETAMINOPHEN 650 MG: 325 TABLET, FILM COATED ORAL at 02:32

## 2019-07-08 RX ADMIN — MORPHINE SULFATE 2 MG: 2 INJECTION, SOLUTION INTRAMUSCULAR; INTRAVENOUS at 00:25

## 2019-07-08 NOTE — ED NOTES
Second dose of morphine ineffective  Pt requesting additional pain medications  Provider aware       Amanda Haynes RN  07/08/19 9714

## 2019-07-08 NOTE — ED NOTES
Pt requesting additional pain medication, states morphine had no effect  Provider to be made aware         Jeanne Martin RN  07/08/19 2236

## 2019-07-11 NOTE — ED PROVIDER NOTES
Pt Name: Shay Jose  MRN: [id number]  [place]: 1975  Age/Sex: 38 y o  male  Date of evaluation: 7/7/2019  PCP: Shaneka Henry MD    [address] [place] [address]    Chief Complaint   Patient presents with    Flank Pain     c/o right flank pain, [facility] told he has possible kidney stone  Pt  states they found blood in urine  pain at 7/10         HPI    Marbinna Sep presents to the Emergency Department complaining of Flank Pain  Shay Jose is a 38 y o  male who presents due to Flank Pain  Pt [facility] seen at [facility], referred to ED r/o Kidney Stone  Pt c/o right flank pain onset yesterday, sts he noticed blood in his urine and thought it would improve on its own though today the pain became worse  Denies vomiting, diarrhea, CP, SOB, abdominal pain, dysuria, and no other complaints at this time          History provided by:  Patient   used: No    Flank Pain   Pain location:  R flank  Pain quality: aching and sharp    Pain radiates to:  Groin  Pain severity:  Moderate  Onset quality:  Gradual  Timing:  Intermittent  Progression:  Waxing and waning  Chronicity:  New  Context: not alcohol use, not awakening from sleep, not diet changes, not eating, not laxative use, not medication withdrawal, not previous surgeries, not recent illness, not recent sexual activity, not recent travel, not retching, not sick contacts, not suspicious food intake and not trauma    Relieved by:  Nothing  Worsened by:  Nothing  Ineffective treatments:  NSAIDs  Associated symptoms: hematuria and nausea    Associated symptoms: no anorexia, no belching, no chest pain, no chills, no constipation, no cough, no diarrhea, no dysuria, no fatigue, no fever, no hematemesis, no hematochezia, no melena, no shortness of breath, no sore throat and no vomiting    Risk factors: no alcohol abuse, no aspirin use, not elderly, has not had multiple surgeries, no NSAID use, not obese, not pregnant and no recent hospitalization Past Medical and Surgical History    Past Medical History:   Diagnosis Date    Hernia, umbilical     Hyperlipidemia     Hypertension     Spinal stenosis        Past Surgical History:   Procedure Laterality Date    HERNIA REPAIR      KNEE SURGERY      x2       History reviewed  No pertinent family history  Social History     Tobacco Use    Smoking status: Never Smoker    Smokeless tobacco: Never Used   Substance Use Topics    Alcohol use: No    Drug use: No              Allergies    Allergies   Allergen Reactions    Pollen Extract     Codeine Palpitations       Home Medications:    Prior to Admission medications    Medication Sig Start Date End Date Taking? Authorizing Provider   acetaminophen (TYLENOL) 325 mg tablet Take 2 tablets (650 mg total) by mouth every 4 (four) hours as needed for mild pain or headaches 2/24/19   Kaelyn Small MD   atorvastatin (LIPITOR) 40 mg tablet Take 0 5 tablets (20 mg total) by mouth every evening 2/24/19   Kaelyn Small MD   busPIRone (BUSPAR) 7 5 mg tablet Take 7 5 mg by mouth 2 (two) times a day 2/27/19   Historical Provider, MD   fenofibrate (TRICOR) 48 mg tablet Take by mouth daily Takes 160 mg once a day    Historical Provider, MD   losartan (COZAAR) 50 mg tablet Take 100 mg by mouth daily     Historical Provider, MD   multivitamin (THERAGRAN) TABS Take 1 tablet by mouth    Historical Provider, MD   oxyCODONE (ROXICODONE) 5 mg immediate release tablet Take 1 tablet (5 mg total) by mouth every 4 (four) hours as needed for moderate pain for up to 4 daysMax Daily Amount: 30 mg 7/8/19 7/12/19  Alfred Fleischer, PA-C   tamsulosin (FLOMAX) 0 4 mg Take 1 capsule (0 4 mg total) by mouth daily with dinner for 10 days 7/8/19 7/18/19  Alfred Fleischer, PA-C           Review of Systems    Review of Systems   Constitutional: Negative for appetite change, chills, fatigue and fever  HENT: Negative for sore throat      Respiratory: Negative for cough and shortness of breath  Cardiovascular: Negative for chest pain  Gastrointestinal: Positive for nausea  Negative for abdominal distention, abdominal pain, anorexia, constipation, diarrhea, hematemesis, hematochezia, melena and vomiting  Genitourinary: Positive for decreased urine volume, flank pain and hematuria  Negative for difficulty urinating and dysuria  Skin: Negative for rash  Neurological: Negative for dizziness and light-headedness  All other systems reviewed and negative  Physical Exam      ED Triage Vitals   Temperature Pulse Respirations Blood Pressure SpO2   07/07/19 2304 07/07/19 2305 07/07/19 2305 07/07/19 2305 07/07/19 2305   97 8 °F (36 6 °C) 62 16 162/85 97 %      Temp Source Heart Rate Source Patient Position - Orthostatic VS BP Location FiO2 (%)   07/07/19 2304 07/07/19 2305 07/07/19 2305 07/07/19 2305 --   Oral Monitor Sitting Right arm       Pain Score       07/07/19 2305       7               Physical Exam   Constitutional: He is oriented to person, place, and time  He appears well-developed and well-nourished  No distress  HENT:   Head: Normocephalic and atraumatic  Mouth/Throat: Oropharynx is clear and moist    Eyes: Pupils are equal, round, and reactive to light  EOM are normal    Neck: Normal range of motion  Neck supple  No hepatojugular reflux and no JVD present  Carotid bruit is not present  Cardiovascular: Normal rate, regular rhythm, normal heart sounds, intact distal pulses and normal pulses  No extrasystoles are present  PMI is not displaced  Exam reveals no gallop and no friction rub  No murmur heard  Pulses:       Radial pulses are 2+ on the right side, and 2+ on the left side  Dorsalis pedis pulses are 2+ on the right side, and 2+ on the left side  Posterior tibial pulses are 2+ on the right side, and 2+ on the left side  Pulmonary/Chest: Effort normal and breath sounds normal  No stridor  No respiratory distress  He has no wheezes   He has no rales  He exhibits no tenderness  Abdominal: Soft  Bowel sounds are normal  He exhibits no distension and no mass  There is no hepatosplenomegaly  There is no tenderness  There is CVA tenderness (R)  There is no rigidity, no rebound, no guarding, no tenderness at McBurney's point and negative Escobar's sign  No hernia  Negative Escobar's  Negative Appendiceal signs (Psoas, Rovsing's, Obturator)  Negative Peritoneal Signs   Musculoskeletal: Normal range of motion  Neurological: He is alert and oriented to person, place, and time  Skin: Skin is warm and dry  Capillary refill takes less than 2 seconds  He is not diaphoretic  Nursing note and vitals reviewed            Diagnostic Results    ECG      Labs:    Results for orders placed or performed during the hospital encounter of 07/07/19   CBC and differential   Result Value Ref Range    WBC 9 90 4 31 - 10 16 Thousand/uL    RBC 4 25 3 88 - 5 62 Million/uL    Hemoglobin 13 1 12 0 - 17 0 g/dL    Hematocrit 39 0 36 5 - 49 3 %    MCV 92 82 - 98 fL    MCH 30 8 26 8 - 34 3 pg    MCHC 33 6 31 4 - 37 4 g/dL    RDW 12 7 11 6 - 15 1 %    MPV 10 5 8 9 - 12 7 fL    Platelets 876 762 - 280 Thousands/uL    nRBC 0 /100 WBCs    Neutrophils Relative 65 43 - 75 %    Immat GRANS % 1 0 - 2 %    Lymphocytes Relative 23 14 - 44 %    Monocytes Relative 8 4 - 12 %    Eosinophils Relative 3 0 - 6 %    Basophils Relative 0 0 - 1 %    Neutrophils Absolute 6 29 1 85 - 7 62 Thousands/µL    Immature Grans Absolute 0 12 0 00 - 0 20 Thousand/uL    Lymphocytes Absolute 2 31 0 60 - 4 47 Thousands/µL    Monocytes Absolute 0 81 0 17 - 1 22 Thousand/µL    Eosinophils Absolute 0 33 0 00 - 0 61 Thousand/µL    Basophils Absolute 0 04 0 00 - 0 10 Thousands/µL   Comprehensive metabolic panel   Result Value Ref Range    Sodium 142 136 - 145 mmol/L    Potassium 3 7 3 5 - 5 3 mmol/L    Chloride 105 100 - 108 mmol/L    CO2 27 21 - 32 mmol/L    ANION GAP 10 4 - 13 mmol/L    BUN 19 5 - 25 mg/dL    Creatinine 1 37 (H) 0 60 - 1 30 mg/dL    Glucose 129 65 - 140 mg/dL    Calcium 9 1 8 3 - 10 1 mg/dL    AST 22 5 - 45 U/L    ALT 38 12 - 78 U/L    Alkaline Phosphatase 54 46 - 116 U/L    Total Protein 7 0 6 4 - 8 2 g/dL    Albumin 4 0 3 5 - 5 0 g/dL    Total Bilirubin 0 30 0 20 - 1 00 mg/dL    eGFR 63 ml/min/1 73sq m   UA w Reflex to Microscopic w Reflex to Culture   Result Value Ref Range    Color, UA Yellow     Clarity, UA Clear     Specific Gravity, UA 1 025 1 003 - 1 030    pH, UA 5 5 4 5, 5 0, 5 5, 6 0, 6 5, 7 0, 7 5, 8 0    Leukocytes, UA Negative Negative    Nitrite, UA Negative Negative    Protein, UA Negative Negative mg/dl    Glucose, UA Negative Negative mg/dl    Ketones, UA Negative Negative mg/dl    Urobilinogen, UA 0 2 0 2, 1 0 E U /dl E U /dl    Bilirubin, UA Negative Negative    Blood, UA Small (A) Negative   Urine Microscopic   Result Value Ref Range    RBC, UA 4-10 (A) None Seen, 0-5 /hpf    WBC, UA 0-1 (A) None Seen, 0-5, 5-55, 5-65 /hpf    Epithelial Cells None Seen None Seen, Occasional /hpf    Bacteria, UA Occasional None Seen, Occasional /hpf   ED Urine Macroscopic   Result Value Ref Range    Color, UA Yellow     Clarity, UA Clear     pH, UA 6 0 4 5 - 8 0    Leukocytes, UA Negative Negative    Nitrite, UA Negative Negative    Protein, UA Negative Negative mg/dl    Glucose, UA Negative Negative mg/dl    Ketones, UA Negative Negative mg/dl    Urobilinogen, UA 0 2 0 2, 1 0 E U /dl E U /dl    Bilirubin, UA Negative Negative    Blood, UA Small (A) Negative    Specific Gravity, UA 1 025 1 003 - 1 030       All labs reviewed and utilized in the medical decision making process    Radiology:    CT renal stone study abdomen pelvis wo contrast   Final Result     3 to 4 mm obstructing stone at the right ureteral pelvic junction with associated mild to moderate right hydronephrosis and obstructive uropathy  Fatty infiltration of the liver is suspected    In the setting of abdominal pain and/or elevated liver function tests, consider steatohepatitis  Other findings as above  Workstation performed: [id number]             All radiology studies independently viewed by me and interpreted by the radiologist     Procedure    Procedures      Assessment and Plan    MDM  Number of Diagnoses or Management Options  Ureterolithiasis: new, needed workup     Amount and/or Complexity of Data Reviewed  Clinical lab tests: ordered and reviewed  Tests in the radiology section of CPT®: ordered and reviewed  Tests in the medicine section of CPT®: reviewed and ordered  Review and summarize past medical records: yes  Independent visualization of images, tracings, or specimens: yes    Risk of Complications, Morbidity, and/or Mortality  Presenting problems: moderate  Diagnostic procedures: moderate  Management options: moderate    Patient Progress  Patient progress: improved      Initial ED assessment:  Nabil Larsen is a 38 y o  male with no significant PMH who presents with R Flank Pain  Vitals signs reviewed  Physical examination remarkable for R CVAT  Initial Ddx  includes but is not limited to:   renal colic, pyelonephritis, UTI, GI etiology, appendicitis, mesenteric adenitis, diverticulitis, pancreatitis, cholecystitis, biliary colic, AAA, rhabdomyolysis, tumor  Initial ED plan:   Plan will be to perform diagnostic testing of CBC, CMP, Urinalysis and treat symptomatically with IVF+analgesia  Final ED summary/disposition: Discussed results of diagnostic testing with pt in detail  Home care recommendations given with discharge paperwork  Return to ED instructions given if new/worsening sxs        MDM  Reviewed: previous chart, nursing note and vitals  Interpretation: labs and CT scan        ED Course of Care and Re-Assessments                            Medications   morphine injection 2 mg (2 mg Intravenous Given 7/8/19 0025)   sodium chloride 0 9 % bolus 1,000 mL (0 mL Intravenous Stopped 7/8/19 0240)   ondansetron [facility]) injection 4 mg (4 mg Intravenous Given 7/8/19 0024)   morphine injection 2 mg (2 mg Intravenous Given 7/8/19 0130)   ondansetron (ZOFRAN) injection 4 mg (4 mg Intravenous Given 7/8/19 0130)   ketorolac (TORADOL) injection 15 mg (15 mg Intravenous Given 7/8/19 0235)   acetaminophen (TYLENOL) tablet 650 mg (650 mg Oral Given 7/8/19 0232)   tamsulosin (FLOMAX) capsule 0 4 mg (0 4 mg Oral Given 7/8/19 0232)   oxyCODONE (ROXICODONE) IR tablet 5 mg (5 mg Oral Given 7/8/19 0247)         FINAL IMPRESSION    Final diagnoses:   Ureterolithiasis         DISPOSITION/PLAN  Time reflects when diagnosis was documented in both MDM as applicable and the Disposition within this note     Time User Action Codes Description Comment    7/8/2019  2:21 AM Sang Silvia Add [R10 9] Right flank pain     7/8/2019  2:21 AM Sang Silvia Remove [R10 9] Right flank pain     7/8/2019  2:21 AM Sang Silvia Add [N20 1] Ureterolithiasis       ED Disposition     ED Disposition Condition Date/Time Comment    Discharge Stable Mon Jul 8, 2019  2:20 AM [facility] discharge to home/self care              Follow-up Information     Follow up With Specialties Details Why Contact Info Additional Information    Vashti Briggs MD Internal Medicine Go to  For follow up [address] [address]        [facility] Emergency Medicine Go to  For follow up [address]  [phone] [facility] ED, [address], [place], [place], [address] [facility] Call  For follow up Gildardo Cage [address] [phone]  [address]  [facility] [place], [address] [facility] [address] [place], [place], [address]              PATIENT REFERRED TO:    Vashti Briggs, [address]  [phone]    Go to   For follow up    [facility] [address] [facility] [address] [address]  [phone]  Go to   For follow up    [facility] [place]  [place]  [phone]  Call   For follow up      [address]:    Discharge Medication List as of 7/8/2019  2:23 AM      START taking these medications    Details   oxyCODONE (ROXICODONE) 5 mg immediate release tablet Take 1 tablet (5 mg total) by mouth every 4 (four) hours as needed for moderate pain for up to 4 daysMax Daily Amount: 30 mg, Starting Mon 7/8/2019, Until Fri 7/12/2019, Print      tamsulosin (FLOMAX) 0 4 mg Take 1 capsule (0 4 mg total) by mouth daily with dinner for 10 days, Starting Mon 7/8/2019, Until Thu 7/18/2019, Print         CONTINUE these medications which have NOT CHANGED    Details   acetaminophen (TYLENOL) 325 mg tablet Take 2 tablets (650 mg total) by mouth every 4 (four) hours as needed for mild pain or headaches, Starting Sun 2/24/2019, Normal      atorvastatin (LIPITOR) 40 mg tablet Take 0 5 tablets (20 mg total) by mouth every evening, Starting Sun 2/24/2019, Normal      busPIRone (BUSPAR) 7 5 mg tablet Take 7 5 mg by mouth 2 (two) times a day, Starting Wed 2/27/2019, Historical Med      fenofibrate (TRICOR) 48 mg tablet Take by mouth daily Takes 160 mg once a day, Historical Med      losartan (COZAAR) 50 mg tablet Take 100 mg by mouth daily , Historical Med      multivitamin (THERAGRAN) TABS Take 1 tablet by mouth, Historical Med             No discharge procedures on file           KRUNAL Ambrocio PA-C  07/11/19 4272

## 2020-03-02 ENCOUNTER — APPOINTMENT (EMERGENCY)
Dept: RADIOLOGY | Facility: HOSPITAL | Age: 45
End: 2020-03-02
Payer: COMMERCIAL

## 2020-03-02 ENCOUNTER — HOSPITAL ENCOUNTER (EMERGENCY)
Facility: HOSPITAL | Age: 45
Discharge: HOME | End: 2020-03-02
Attending: EMERGENCY MEDICINE
Payer: COMMERCIAL

## 2020-03-02 VITALS
HEART RATE: 71 BPM | BODY MASS INDEX: 37.75 KG/M2 | RESPIRATION RATE: 18 BRPM | DIASTOLIC BLOOD PRESSURE: 82 MMHG | OXYGEN SATURATION: 98 % | WEIGHT: 294 LBS | SYSTOLIC BLOOD PRESSURE: 168 MMHG | TEMPERATURE: 98.5 F

## 2020-03-02 DIAGNOSIS — R06.00 DYSPNEA ON EXERTION: Primary | ICD-10-CM

## 2020-03-02 DIAGNOSIS — I16.0 HYPERTENSIVE URGENCY: ICD-10-CM

## 2020-03-02 LAB
ALBUMIN SERPL BCP-MCNC: 4.4 G/DL (ref 3.5–5)
ALP SERPL-CCNC: 41 U/L (ref 46–116)
ALT SERPL W P-5'-P-CCNC: 53 U/L (ref 12–78)
ANION GAP SERPL CALCULATED.3IONS-SCNC: 12 MMOL/L (ref 4–13)
AST SERPL W P-5'-P-CCNC: 24 U/L (ref 5–45)
BASOPHILS # BLD AUTO: 0.02 THOUSANDS/ΜL (ref 0–0.1)
BASOPHILS NFR BLD AUTO: 0 % (ref 0–1)
BILIRUB SERPL-MCNC: 0.38 MG/DL (ref 0.2–1)
BUN SERPL-MCNC: 13 MG/DL (ref 5–25)
CALCIUM SERPL-MCNC: 9 MG/DL (ref 8.3–10.1)
CHLORIDE SERPL-SCNC: 106 MMOL/L (ref 100–108)
CO2 SERPL-SCNC: 24 MMOL/L (ref 21–32)
CREAT SERPL-MCNC: 0.76 MG/DL (ref 0.6–1.3)
EOSINOPHIL # BLD AUTO: 0.16 THOUSAND/ΜL (ref 0–0.61)
EOSINOPHIL NFR BLD AUTO: 3 % (ref 0–6)
ERYTHROCYTE [DISTWIDTH] IN BLOOD BY AUTOMATED COUNT: 12.7 % (ref 11.6–15.1)
GFR SERPL CREATININE-BSD FRML MDRD: 112 ML/MIN/1.73SQ M
GLUCOSE SERPL-MCNC: 94 MG/DL (ref 65–140)
HCT VFR BLD AUTO: 38.6 % (ref 36.5–49.3)
HGB BLD-MCNC: 13.4 G/DL (ref 12–17)
IMM GRANULOCYTES # BLD AUTO: 0.05 THOUSAND/UL (ref 0–0.2)
IMM GRANULOCYTES NFR BLD AUTO: 1 % (ref 0–2)
LYMPHOCYTES # BLD AUTO: 2.29 THOUSANDS/ΜL (ref 0.6–4.47)
LYMPHOCYTES NFR BLD AUTO: 40 % (ref 14–44)
MCH RBC QN AUTO: 31.7 PG (ref 26.8–34.3)
MCHC RBC AUTO-ENTMCNC: 34.7 G/DL (ref 31.4–37.4)
MCV RBC AUTO: 91 FL (ref 82–98)
MONOCYTES # BLD AUTO: 0.43 THOUSAND/ΜL (ref 0.17–1.22)
MONOCYTES NFR BLD AUTO: 8 % (ref 4–12)
NEUTROPHILS # BLD AUTO: 2.79 THOUSANDS/ΜL (ref 1.85–7.62)
NEUTS SEG NFR BLD AUTO: 48 % (ref 43–75)
NRBC BLD AUTO-RTO: 0 /100 WBCS
PLATELET # BLD AUTO: 198 THOUSANDS/UL (ref 149–390)
PMV BLD AUTO: 10.4 FL (ref 8.9–12.7)
POTASSIUM SERPL-SCNC: 3.8 MMOL/L (ref 3.5–5.3)
PROT SERPL-MCNC: 7.3 G/DL (ref 6.4–8.2)
RBC # BLD AUTO: 4.23 MILLION/UL (ref 3.88–5.62)
SODIUM SERPL-SCNC: 142 MMOL/L (ref 136–145)
TROPONIN I SERPL-MCNC: <0.02 NG/ML
TROPONIN I SERPL-MCNC: <0.02 NG/ML
WBC # BLD AUTO: 5.74 THOUSAND/UL (ref 4.31–10.16)

## 2020-03-02 PROCEDURE — 99284 EMERGENCY DEPT VISIT MOD MDM: CPT

## 2020-03-02 PROCEDURE — 85025 COMPLETE CBC W/AUTO DIFF WBC: CPT | Performed by: EMERGENCY MEDICINE

## 2020-03-02 PROCEDURE — 71046 X-RAY EXAM CHEST 2 VIEWS: CPT

## 2020-03-02 PROCEDURE — 80053 COMPREHEN METABOLIC PANEL: CPT | Performed by: EMERGENCY MEDICINE

## 2020-03-02 PROCEDURE — 36415 COLL VENOUS BLD VENIPUNCTURE: CPT

## 2020-03-02 PROCEDURE — 93005 ELECTROCARDIOGRAM TRACING: CPT

## 2020-03-02 PROCEDURE — 99283 EMERGENCY DEPT VISIT LOW MDM: CPT | Performed by: EMERGENCY MEDICINE

## 2020-03-02 PROCEDURE — 84484 ASSAY OF TROPONIN QUANT: CPT | Performed by: EMERGENCY MEDICINE

## 2020-03-02 NOTE — ED PROVIDER NOTES
History  Chief Complaint   Patient presents with    Hypertension     Pt reports having SOB and "leg heaviness when I walked up 4 flights of stairs " Pt reports due to those symptoms he had the nurse take his BP and it was "high" Pt denies any chest pain     About 10 hours prior patient states he walked up4  flights of stairs and got short of breath  , states last time he walked up this flight of stairs about 2 months ago but does not believe he was this short of breath he became very anxious went to the nurse's station, had blood pressure reading was 200/100 was advised come here  Patient denies any associated chest pain  No diaphoresis but did state he felt red in the face  , no other modifying or alleviating factors  , no nausea no pain no radiation, shortness of breath resolved after about 2 minutes  , currently asymptomatic  Prior to Admission Medications   Prescriptions Last Dose Informant Patient Reported? Taking?   acetaminophen (TYLENOL) 325 mg tablet   No No   Sig: Take 2 tablets (650 mg total) by mouth every 4 (four) hours as needed for mild pain or headaches   atorvastatin (LIPITOR) 40 mg tablet   No No   Sig: Take 0 5 tablets (20 mg total) by mouth every evening   busPIRone (BUSPAR) 7 5 mg tablet   Yes No   Sig: Take 7 5 mg by mouth 2 (two) times a day   fenofibrate (TRICOR) 48 mg tablet   Yes No   Sig: Take by mouth daily Takes 160 mg once a day   losartan (COZAAR) 50 mg tablet   Yes No   Sig: Take 100 mg by mouth daily    multivitamin (THERAGRAN) TABS   Yes No   Sig: Take 1 tablet by mouth   tamsulosin (FLOMAX) 0 4 mg   No No   Sig: Take 1 capsule (0 4 mg total) by mouth daily with dinner for 10 days      Facility-Administered Medications: None       Past Medical History:   Diagnosis Date    Hernia, umbilical     Hyperlipidemia     Hypertension     Spinal stenosis        Past Surgical History:   Procedure Laterality Date    HERNIA REPAIR      KNEE SURGERY      x2       History reviewed  No pertinent family history  I have reviewed and agree with the history as documented  E-Cigarette/Vaping     E-Cigarette/Vaping Substances     Social History     Tobacco Use    Smoking status: Never Smoker    Smokeless tobacco: Never Used   Substance Use Topics    Alcohol use: No    Drug use: No       Review of Systems   Constitutional: Negative for activity change, chills, diaphoresis and fever  HENT: Negative for congestion, sinus pressure and sore throat  Eyes: Negative for pain and visual disturbance  Respiratory: Positive for shortness of breath  Negative for cough, chest tightness, wheezing and stridor  Cardiovascular: Negative for chest pain and palpitations  Gastrointestinal: Negative for abdominal distention, abdominal pain, constipation, diarrhea, nausea and vomiting  Genitourinary: Negative for dysuria and frequency  Musculoskeletal: Negative for neck pain and neck stiffness  Skin: Negative for rash  Neurological: Negative for dizziness, speech difficulty, light-headedness, numbness and headaches  Physical Exam  Physical Exam   Constitutional: He is oriented to person, place, and time  He appears well-developed  No distress  Obesity   HENT:   Head: Normocephalic and atraumatic  Eyes: Pupils are equal, round, and reactive to light  Neck: Normal range of motion  Neck supple  No tracheal deviation present  Cardiovascular: Normal rate, regular rhythm, normal heart sounds and intact distal pulses  No murmur heard  Pulmonary/Chest: Effort normal and breath sounds normal  No stridor  No respiratory distress  Abdominal: Soft  He exhibits no distension  There is no tenderness  There is no rebound and no guarding  Musculoskeletal: Normal range of motion  Neurological: He is alert and oriented to person, place, and time  Skin: Skin is warm and dry  He is not diaphoretic  No erythema  No pallor  Psychiatric: He has a normal mood and affect     Vitals reviewed        Vital Signs  ED Triage Vitals [03/02/20 1519]   Temperature Pulse Respirations Blood Pressure SpO2   98 5 °F (36 9 °C) 71 18 168/82 98 %      Temp Source Heart Rate Source Patient Position - Orthostatic VS BP Location FiO2 (%)   Oral Monitor Sitting Left arm --      Pain Score       No Pain           Vitals:    03/02/20 1519   BP: 168/82   Pulse: 71   Patient Position - Orthostatic VS: Sitting         Visual Acuity      ED Medications  Medications - No data to display    Diagnostic Studies  Results Reviewed     Procedure Component Value Units Date/Time    Troponin I [[id number]]  (Normal) Collected:  03/02/20 1749    Lab Status:  Final result Specimen:  Blood from Arm, Left Updated:  03/02/20 1834     Troponin I <0 02 ng/mL     Troponin I [[id number]]  (Normal) Collected:  03/02/20 1536    Lab Status:  Final result Specimen:  Blood from Arm, Right Updated:  03/02/20 1629     Troponin I <0 02 ng/mL     Comprehensive metabolic panel [[id number]]  (Abnormal) Collected:  03/02/20 1536    Lab Status:  Final result Specimen:  Blood from Arm, Right Updated:  03/02/20 1626     Sodium 142 mmol/L      Potassium 3 8 mmol/L      Chloride 106 mmol/L      CO2 24 mmol/L      ANION GAP 12 mmol/L      BUN 13 mg/dL      Creatinine 0 76 mg/dL      Glucose 94 mg/dL      Calcium 9 0 mg/dL      AST 24 U/L      ALT 53 U/L      Alkaline Phosphatase 41 U/L      Total Protein 7 3 g/dL      Albumin 4 4 g/dL      Total Bilirubin 0 38 mg/dL      eGFR 112 ml/min/1 73sq m     Narrative:       V[facility]ctor guidelines for Chronic Kidney Disease (CKD):     Stage 1 with normal or high GFR (GFR > 90 mL/min/1 73 square meters)    Stage 2 Mild CKD (GFR = 60-89 mL/min/1 73 square meters)    Stage 3A Moderate CKD (GFR = 45-59 mL/min/1 73 square meters)    Stage 3B Moderate CKD (GFR = 30-44 mL/min/1 73 square meters)    Stage 4 Severe CKD (GFR = 15-29 mL/min/1 73 square meters)    Stage 5 End Stage CKD (GFR <15 mL/min/1 73 square meters)  Note: GFR calculation is accurate only with a steady state creatinine    CBC and differential [[id number]] Collected:  03/02/20 1536    Lab Status:  Final result Specimen:  Blood from Arm, Right Updated:  03/02/20 1610     WBC 5 74 Thousand/uL      RBC 4 23 Million/uL      Hemoglobin 13 4 g/dL      Hematocrit 38 6 %      MCV 91 fL      MCH 31 7 pg      MCHC 34 7 g/dL      RDW 12 7 %      MPV 10 4 fL      Platelets 435 Thousands/uL      nRBC 0 /100 WBCs      Neutrophils Relative 48 %      Immat GRANS % 1 %      Lymphocytes Relative 40 %      Monocytes Relative 8 %      Eosinophils Relative 3 %      Basophils Relative 0 %      Neutrophils Absolute 2 79 Thousands/µL      Immature Grans Absolute 0 05 Thousand/uL      Lymphocytes Absolute 2 29 Thousands/µL      Monocytes Absolute 0 43 Thousand/µL      Eosinophils Absolute 0 16 Thousand/µL      Basophils Absolute 0 02 Thousands/µL                  XR chest 2 views   Final Result by Carver Gowers, MD (03/02 1621)      No acute cardiopulmonary disease              Workstation performed: [id number]                    Procedures  ECG 12 Lead Documentation Only  Date/Time: 3/2/2020 3:30 PM  Performed by: Marcos Echavarria DO  Authorized by: Marcos Echavarria DO     ECG reviewed by me, the ED Provider: yes    Patient location:  ED  Previous ECG:     Previous ECG:  Compared to current    Comparison ECG info:  2 23 20    Similarity:  No change  Interpretation:     Interpretation: normal    Rate:     ECG rate:  63    ECG rate assessment: normal    Rhythm:     Rhythm: sinus rhythm    Ectopy:     Ectopy: none    QRS:     QRS axis:  Normal    QRS intervals:  Normal  Conduction:     Conduction: normal    ST segments:     ST segments:  Normal  T waves:     T waves: normal      ECG 12 Lead Documentation Only  Date/Time: 3/2/2020 5:42 PM  Performed by: Marcos Echavarria DO  Authorized by: Marcos Echavarria DO     ECG reviewed by me, the ED Provider: yes    Patient location:  ED  Previous ECG:     Previous ECG:  Compared to current    Comparison ECG info:  2 hours prior    Similarity:  No change  Interpretation:     Interpretation: normal    Rate:     ECG rate:  59    ECG rate assessment: normal    Rhythm:     Rhythm: sinus rhythm    Ectopy:     Ectopy: none    QRS:     QRS axis:  Normal    QRS intervals:  Normal  Conduction:     Conduction: normal    ST segments:     ST segments:  Normal  T waves:     T waves: normal               ED Course         HEART Risk Score      Most Recent Value   Heart Score Risk Calculator   History  1 Filed at: 03/02/2020 1739   ECG  0 Filed at: 03/02/2020 1739   Age  0 Filed at: 03/02/2020 1739   Risk Factors  2 Filed at: 03/02/2020 1739   Troponin  0 Filed at: 03/02/2020 1739   HEART Score  3 Filed at: 03/02/2020 1739                            MDM  Number of Diagnoses or Management Options  Dyspnea on exertion: new and requires workup  Hypertensive urgency: new and requires workup  Diagnosis management comments:       Initial ED assessment:  27-year-old male shortness of breath after ambulating up 4 flights of stairs, patient is obese, 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, currently 168/82, he admits he was very anxious at that time    Initial DDx includes but is not limited to:   Deconditioning, less likely ACS    Initial ED plan:   Delta troponins, likely discharge with outpatient PCP follow-up/stress test        Final ED summary/disposition:   After evaluation and workup in the emergency department, workup unremarkable, patient discharged degrees call follow-up with his PCP           Amount and/or Complexity of Data Reviewed  Clinical lab tests: ordered and reviewed  Tests in the radiology section of CPT®: ordered and reviewed  Review and summarize past medical records: yes  Independent visualization of images, tracings, or specimens: yes          Disposition  Final diagnoses:   Dyspnea on exertion   Hypertensive urgency Time reflects when diagnosis was documented in both MDM as applicable and the Disposition within this note     Time User Action Codes Description Comment    3/2/2020  7:06 PM Veronica Garces Add [R06 09] Dyspnea on exertion     3/2/2020  7:06 PM Veronica Garces Add [I16 0] Hypertensive urgency       ED Disposition     ED Disposition Condition Date/Time Comment    Discharge Stable Mon Mar 2, 2020  7:06 PM [facility] discharge to home/self care  Follow-up Information     Follow up With Specialties Details Why Contact Info    Marlene Gramajo MD Internal Medicine Call in 1 day To arrange for the next available appointment [place]  [phone]            Patient's Medications   Discharge Prescriptions    No medications on file     No discharge procedures on file      PDMP Review     None          ED Provider  Electronically Signed by           Jaiden Perez DO  03/02/20 5186

## 2020-03-04 LAB
ATRIAL RATE: 62 BPM
ATRIAL RATE: 63 BPM
P AXIS: 21 DEGREES
P AXIS: 75 DEGREES
PR INTERVAL: 180 MS
PR INTERVAL: 184 MS
QRS AXIS: 53 DEGREES
QRS AXIS: 57 DEGREES
QRSD INTERVAL: 104 MS
QRSD INTERVAL: 90 MS
QT INTERVAL: 376 MS
QT INTERVAL: 398 MS
QTC INTERVAL: 381 MS
QTC INTERVAL: 407 MS
T WAVE AXIS: 17 DEGREES
T WAVE AXIS: 24 DEGREES
VENTRICULAR RATE: 62 BPM
VENTRICULAR RATE: 63 BPM

## 2020-03-04 PROCEDURE — 93010 ELECTROCARDIOGRAM REPORT: CPT | Performed by: INTERNAL MEDICINE

## 2022-09-26 PROCEDURE — 93005 ELECTROCARDIOGRAM TRACING: CPT

## 2022-09-26 PROCEDURE — 99285 EMERGENCY DEPT VISIT HI MDM: CPT

## 2022-09-27 ENCOUNTER — HOSPITAL ENCOUNTER (OUTPATIENT)
Facility: HOSPITAL | Age: 47
Setting detail: OBSERVATION
Discharge: HOME | End: 2022-09-27
Attending: EMERGENCY MEDICINE | Admitting: INTERNAL MEDICINE
Payer: COMMERCIAL

## 2022-09-27 ENCOUNTER — APPOINTMENT (OUTPATIENT)
Dept: RADIOLOGY | Facility: HOSPITAL | Age: 47
End: 2022-09-27
Payer: COMMERCIAL

## 2022-09-27 VITALS
SYSTOLIC BLOOD PRESSURE: 131 MMHG | DIASTOLIC BLOOD PRESSURE: 68 MMHG | HEART RATE: 64 BPM | OXYGEN SATURATION: 100 % | RESPIRATION RATE: 18 BRPM | TEMPERATURE: 97.5 F

## 2022-09-27 DIAGNOSIS — R07.89 ATYPICAL CHEST PAIN: Primary | ICD-10-CM

## 2022-09-27 DIAGNOSIS — R06.02 SOB (SHORTNESS OF BREATH): ICD-10-CM

## 2022-09-27 LAB
ALBUMIN SERPL BCP-MCNC: 4.8 G/DL (ref 3.5–5)
ALP SERPL-CCNC: 44 U/L (ref 34–104)
ALT SERPL W P-5'-P-CCNC: 47 U/L (ref 7–52)
ANION GAP SERPL CALCULATED.3IONS-SCNC: 8 MMOL/L (ref 4–13)
AST SERPL W P-5'-P-CCNC: 27 U/L (ref 13–39)
ATRIAL RATE: 68 BPM
BASOPHILS # BLD AUTO: 0.03 THOUSANDS/ΜL (ref 0–0.1)
BASOPHILS NFR BLD AUTO: 0 % (ref 0–1)
BILIRUB SERPL-MCNC: 0.62 MG/DL (ref 0.2–1)
BUN SERPL-MCNC: 17 MG/DL (ref 5–25)
CALCIUM SERPL-MCNC: 9.7 MG/DL (ref 8.4–10.2)
CARDIAC TROPONIN I PNL SERPL HS: <2 NG/L
CHLORIDE SERPL-SCNC: 105 MMOL/L (ref 96–108)
CO2 SERPL-SCNC: 26 MMOL/L (ref 21–32)
CREAT SERPL-MCNC: 0.77 MG/DL (ref 0.6–1.3)
EOSINOPHIL # BLD AUTO: 0.15 THOUSAND/ΜL (ref 0–0.61)
EOSINOPHIL NFR BLD AUTO: 2 % (ref 0–6)
ERYTHROCYTE [DISTWIDTH] IN BLOOD BY AUTOMATED COUNT: 12.8 % (ref 11.6–15.1)
GFR SERPL CREATININE-BSD FRML MDRD: 108 ML/MIN/1.73SQ M
GLUCOSE SERPL-MCNC: 131 MG/DL (ref 65–140)
HCT VFR BLD AUTO: 40.6 % (ref 36.5–49.3)
HGB BLD-MCNC: 13.8 G/DL (ref 12–17)
IMM GRANULOCYTES # BLD AUTO: 0.04 THOUSAND/UL (ref 0–0.2)
IMM GRANULOCYTES NFR BLD AUTO: 1 % (ref 0–2)
LYMPHOCYTES # BLD AUTO: 3.34 THOUSANDS/ΜL (ref 0.6–4.47)
LYMPHOCYTES NFR BLD AUTO: 46 % (ref 14–44)
MCH RBC QN AUTO: 30.4 PG (ref 26.8–34.3)
MCHC RBC AUTO-ENTMCNC: 34 G/DL (ref 31.4–37.4)
MCV RBC AUTO: 89 FL (ref 82–98)
MONOCYTES # BLD AUTO: 0.51 THOUSAND/ΜL (ref 0.17–1.22)
MONOCYTES NFR BLD AUTO: 7 % (ref 4–12)
NEUTROPHILS # BLD AUTO: 3.21 THOUSANDS/ΜL (ref 1.85–7.62)
NEUTS SEG NFR BLD AUTO: 44 % (ref 43–75)
NRBC BLD AUTO-RTO: 0 /100 WBCS
P AXIS: 59 DEGREES
PLATELET # BLD AUTO: 192 THOUSANDS/UL (ref 149–390)
PMV BLD AUTO: 10.6 FL (ref 8.9–12.7)
POTASSIUM SERPL-SCNC: 3.7 MMOL/L (ref 3.5–5.3)
PR INTERVAL: 200 MS
PROT SERPL-MCNC: 7.2 G/DL (ref 6.4–8.4)
QRS AXIS: 104 DEGREES
QRSD INTERVAL: 92 MS
QT INTERVAL: 376 MS
QTC INTERVAL: 399 MS
RBC # BLD AUTO: 4.54 MILLION/UL (ref 3.88–5.62)
SODIUM SERPL-SCNC: 139 MMOL/L (ref 135–147)
T WAVE AXIS: 12 DEGREES
VENTRICULAR RATE: 68 BPM
WBC # BLD AUTO: 7.28 THOUSAND/UL (ref 4.31–10.16)

## 2022-09-27 PROCEDURE — 71045 X-RAY EXAM CHEST 1 VIEW: CPT

## 2022-09-27 PROCEDURE — 93010 ELECTROCARDIOGRAM REPORT: CPT | Performed by: INTERNAL MEDICINE

## 2022-09-27 PROCEDURE — 84484 ASSAY OF TROPONIN QUANT: CPT

## 2022-09-27 PROCEDURE — 99205 OFFICE O/P NEW HI 60 MIN: CPT | Performed by: INTERNAL MEDICINE

## 2022-09-27 PROCEDURE — 99285 EMERGENCY DEPT VISIT HI MDM: CPT

## 2022-09-27 PROCEDURE — 99220 PR INITIAL OBSERVATION CARE/DAY 70 MINUTES: CPT | Performed by: INTERNAL MEDICINE

## 2022-09-27 PROCEDURE — 36415 COLL VENOUS BLD VENIPUNCTURE: CPT

## 2022-09-27 PROCEDURE — 84484 ASSAY OF TROPONIN QUANT: CPT | Performed by: EMERGENCY MEDICINE

## 2022-09-27 PROCEDURE — 80053 COMPREHEN METABOLIC PANEL: CPT | Performed by: EMERGENCY MEDICINE

## 2022-09-27 PROCEDURE — 85025 COMPLETE CBC W/AUTO DIFF WBC: CPT | Performed by: EMERGENCY MEDICINE

## 2022-09-27 RX ORDER — LORATADINE 10 MG/1
10 TABLET ORAL DAILY
COMMUNITY

## 2022-09-27 RX ORDER — OMEGA-3-ACID ETHYL ESTERS 1 G/1
2 CAPSULE, LIQUID FILLED ORAL 2 TIMES DAILY
COMMUNITY

## 2022-09-27 RX ORDER — BUSPIRONE HYDROCHLORIDE 5 MG/1
7.5 TABLET ORAL 2 TIMES DAILY
Status: DISCONTINUED | OUTPATIENT
Start: 2022-09-27 | End: 2022-09-27 | Stop reason: HOSPADM

## 2022-09-27 RX ORDER — ALPRAZOLAM 0.25 MG/1
0.5 TABLET ORAL
Status: DISCONTINUED | OUTPATIENT
Start: 2022-09-27 | End: 2022-09-27 | Stop reason: HOSPADM

## 2022-09-27 RX ORDER — LORATADINE 10 MG/1
10 TABLET ORAL DAILY
Status: DISCONTINUED | OUTPATIENT
Start: 2022-09-27 | End: 2022-09-27 | Stop reason: HOSPADM

## 2022-09-27 RX ORDER — FENOFIBRATE 160 MG/1
160 TABLET ORAL DAILY
Qty: 30 TABLET
Start: 2022-09-27

## 2022-09-27 RX ORDER — ALPRAZOLAM 0.5 MG/1
TABLET ORAL
COMMUNITY

## 2022-09-27 RX ORDER — ATORVASTATIN CALCIUM 40 MG/1
20 TABLET, FILM COATED ORAL EVERY EVENING
Status: DISCONTINUED | OUTPATIENT
Start: 2022-09-27 | End: 2022-09-27 | Stop reason: HOSPADM

## 2022-09-27 RX ORDER — ACETAMINOPHEN 325 MG/1
650 TABLET ORAL EVERY 4 HOURS PRN
Status: DISCONTINUED | OUTPATIENT
Start: 2022-09-27 | End: 2022-09-27 | Stop reason: HOSPADM

## 2022-09-27 RX ORDER — FENOFIBRATE 48 MG/1
48 TABLET, COATED ORAL DAILY
Status: DISCONTINUED | OUTPATIENT
Start: 2022-09-27 | End: 2022-09-27 | Stop reason: HOSPADM

## 2022-09-27 RX ORDER — AZELASTINE 1 MG/ML
1 SPRAY, METERED NASAL 2 TIMES DAILY
COMMUNITY

## 2022-09-27 RX ORDER — LOSARTAN POTASSIUM AND HYDROCHLOROTHIAZIDE 12.5; 1 MG/1; MG/1
1 TABLET ORAL DAILY
COMMUNITY

## 2022-09-27 RX ADMIN — BUSPIRONE HYDROCHLORIDE 7.5 MG: 5 TABLET ORAL at 08:49

## 2022-09-27 RX ADMIN — FENOFIBRATE 48 MG: 48 TABLET, FILM COATED ORAL at 08:49

## 2022-09-27 RX ADMIN — LOSARTAN POTASSIUM: 50 TABLET, FILM COATED ORAL at 08:47

## 2022-09-27 RX ADMIN — LORATADINE 10 MG: 10 TABLET ORAL at 08:47

## 2022-09-27 NOTE — ASSESSMENT & PLAN NOTE
Patient with complain of shortness of breath on mild exertion,  noted to be present in the last week as well as chest pain  Denies lower extremity edema, orthopnea  Has history of FIOR, allergic rhinitis (patient mentioned having stuffy nose)  Denies cough, fever, chills  EKG unremarkable  Cardiology consulted   Can be considered echo to rule out cardiac origin of the shortness of breath

## 2022-09-27 NOTE — ED PROVIDER NOTES
History  Chief Complaint   Patient presents with    Shortness of Breath     Pt c/o SOB with mild exertion (walking a short distance), pt recently had stress test for "abnormal ekg", pt also states heaviness in chest and ringing in ears, started 9/26 @ 2000     Patient is a 50-year-old male with PMH of HTN, HLD, and obesity presenting for evaluation of left-sided chest tightness associated with ringing in his ears  He notes "for some time now" he has been experiencing chest pain at rest as well as on exertion  He saw his PCP 4 days ago for this complaint and was scheduled for an outpatient stress test that is scheduled in 4 days  He presents this evening as this chest pain "felt different" and the new sx of ringing in his ears  He also endorses intermittent, but worsening GOSS over the last week  He reports he has previously attributed his CP and SOB to anxiety  He denies fevers, chills, sore throat, cough, abdominal pain, N/V/D, urinary changes, and leg swelling        History provided by:  Patient   used: No    Shortness of Breath  Severity:  Moderate  Onset quality:  Gradual  Duration:  1 week  Timing:  Intermittent  Progression:  Worsening  Chronicity:  New  Context: activity    Relieved by:  Rest  Worsened by:  Exertion, stress and activity  Ineffective treatments:  Rest  Associated symptoms: chest pain    Associated symptoms: no abdominal pain, no claudication, no cough, no diaphoresis, no ear pain, no fever, no headaches, no hemoptysis, no neck pain, no rash, no sore throat, no sputum production, no syncope, no vomiting and no wheezing    Chest pain:     Quality: pressure and tightness      Severity:  Moderate    Onset quality:  Unable to specify    Duration:  1 week    Timing:  Intermittent    Progression:  Worsening    Chronicity:  New  Risk factors: obesity    Risk factors: no recent alcohol use, no family hx of DVT, no hx of cancer, no hx of PE/DVT, no prolonged immobilization, no recent surgery and no tobacco use        Prior to Admission Medications   Prescriptions Last Dose Informant Patient Reported? Taking?    ALPRAZolam (XANAX) 0 5 mg tablet 2022 at Unknown time  Yes Yes   Sig: Take by mouth daily at bedtime as needed for anxiety   acetaminophen (TYLENOL) 325 mg tablet Not Taking at Unknown time  No No   Sig: Take 2 tablets (650 mg total) by mouth every 4 (four) hours as needed for mild pain or headaches   Patient not taking: Reported on 2022   atorvastatin (LIPITOR) 40 mg tablet 2022 at Unknown time  No Yes   Sig: Take 0 5 tablets (20 mg total) by mouth every evening   azelastine (ASTELIN) 0 1 % nasal spray More than a month at Unknown time  Yes No   Si spray into each nostril 2 (two) times a day Use in each nostril as directed   busPIRone (BUSPAR) 7 5 mg tablet 2022 at Unknown time  Yes Yes   Sig: Take 7 5 mg by mouth 2 (two) times a day   fenofibrate (TRICOR) 48 mg tablet 2022 at Unknown time  Yes Yes   Sig: Take by mouth daily Takes 160 mg once a day   loratadine (CLARITIN) 10 mg tablet 2022 at Unknown time  Yes Yes   Sig: Take 10 mg by mouth daily   losartan-hydrochlorothiazide (HYZAAR) 100-12 5 MG per tablet 2022 at Unknown time  Yes Yes   Sig: Take 1 tablet by mouth daily   multivitamin (THERAGRAN) TABS 2022 at Unknown time  Yes Yes   Sig: Take 1 tablet by mouth   omega-3-acid ethyl esters (LOVAZA) 1 g capsule 2022 at Unknown time  Yes Yes   Sig: Take 2 g by mouth 2 (two) times a day      Facility-Administered Medications: None       Past Medical History:   Diagnosis Date    Hernia, umbilical     Hyperlipidemia     Hypertension     Spinal stenosis        Past Surgical History:   Procedure Laterality Date    ANTERIOR CRUCIATE LIGAMENT REPAIR      BASAL CELL CARCINOMA EXCISION      HERNIA REPAIR      Prp I/real init reduc >5 yr    KIDNEY STONE SURGERY      KNEE SURGERY      x2       Family History   Problem Relation Age of Onset    Hyperlipidemia Mother     Hypertension Mother         essential     Diabetes Paternal Grandfather      I have reviewed and agree with the history as documented  E-Cigarette/Vaping     E-Cigarette/Vaping Substances     Social History     Tobacco Use    Smoking status: Never Smoker    Smokeless tobacco: Never Used   Substance Use Topics    Alcohol use: No    Drug use: No       Review of Systems   Constitutional: Negative for chills, diaphoresis and fever  HENT: Negative for ear pain and sore throat  Eyes: Negative for pain and visual disturbance  Respiratory: Positive for chest tightness and shortness of breath  Negative for cough, hemoptysis, sputum production and wheezing  Cardiovascular: Positive for chest pain  Negative for palpitations, claudication, leg swelling and syncope  Gastrointestinal: Negative for abdominal pain, diarrhea, nausea and vomiting  Genitourinary: Negative for dysuria and hematuria  Musculoskeletal: Negative for arthralgias, back pain and neck pain  Skin: Negative for color change and rash  Allergic/Immunologic: Negative for immunocompromised state  Neurological: Negative for dizziness, seizures, syncope, light-headedness and headaches  Ringing in ears x10min at [address], self resolved   All other systems reviewed and are negative  Physical Exam  Physical Exam  Vitals and nursing note reviewed  Constitutional:       General: He is in acute distress (evidencing moderate distress)  Appearance: Normal appearance  He is well-developed  He is obese  He is not ill-appearing, toxic-appearing or diaphoretic  HENT:      Head: Normocephalic and atraumatic  Nose: Nose normal  No congestion or rhinorrhea  Mouth/Throat:      Lips: Pink  No lesions  Mouth: Mucous membranes are moist       Pharynx: Oropharynx is clear  Eyes:      General: Lids are normal  Vision grossly intact  Gaze aligned appropriately   No visual field deficit  Extraocular Movements: Extraocular movements intact  Right eye: Normal extraocular motion and no nystagmus  Left eye: Normal extraocular motion and no nystagmus  Conjunctiva/sclera: Conjunctivae normal       Right eye: Right conjunctiva is not injected  Left eye: Left conjunctiva is not injected  Pupils: Pupils are equal, round, and reactive to light  Neck:      Trachea: Trachea and phonation normal  No abnormal tracheal secretions  Cardiovascular:      Rate and Rhythm: Normal rate and regular rhythm  Pulses: Normal pulses  Radial pulses are 2+ on the right side and 2+ on the left side  Dorsalis pedis pulses are 2+ on the right side and 2+ on the left side  Heart sounds: Normal heart sounds, S1 normal and S2 normal  No murmur heard  Pulmonary:      Effort: Pulmonary effort is normal  No tachypnea or respiratory distress  Breath sounds: Normal breath sounds and air entry  No stridor, decreased air movement or transmitted upper airway sounds  No decreased breath sounds  Chest:      Chest wall: No tenderness  Abdominal:      Palpations: Abdomen is soft  Tenderness: There is no abdominal tenderness  There is no guarding or rebound  Negative signs include Escobar's sign, Rovsing's sign and McBurney's sign  Musculoskeletal:         General: Normal range of motion  Cervical back: Normal range of motion and neck supple  Right lower leg: No edema  Left lower leg: No edema  Comments: FINN, 5/5 strength throughout, sensation intact, no focal joint swelling, no LE edema, ambulatory with steady gait   Skin:     General: Skin is warm and dry  Capillary Refill: Capillary refill takes less than 2 seconds  Findings: No rash or wound  Neurological:      General: No focal deficit present  Mental Status: He is alert and oriented to person, place, and time  Mental status is at baseline        GCS: GCS eye subscore is 4  GCS verbal subscore is 5  GCS motor subscore is 6  Sensory: Sensation is intact  Motor: Motor function is intact  Coordination: Coordination is intact  Gait: Gait is intact           Vital Signs  ED Triage Vitals [09/26/22 2326]   Temperature Pulse Respirations Blood Pressure SpO2   97 5 °F (36 4 °C) 75 18 156/79 97 %      Temp Source Heart Rate Source Patient Position - Orthostatic VS BP Location FiO2 (%)   Oral Monitor Sitting Right arm --      Pain Score       1           Vitals:    09/26/22 2326 09/27/22 0200 09/27/22 0404 09/27/22 0426   BP: 156/79   147/75   Pulse: 75 63 78 61   Patient Position - Orthostatic VS: Sitting   Lying         Visual Acuity      ED Medications  Medications - No data to display    Diagnostic Studies  Results Reviewed     Procedure Component Value Units Date/Time    HS Troponin I 2hr [[id number]]     Lab Status: No result Specimen: Blood     HS Troponin 0hr (reflex protocol) [[id number]]  (Normal) Collected: 09/27/22 0430    Lab Status: Final result Specimen: Blood from Arm, Right Updated: 09/27/22 0510     hs TnI 0hr <2 ng/L     HS Troponin 0hr (reflex protocol) [[id number]]  (Normal) Collected: 09/27/22 0158    Lab Status: Final result Specimen: Blood from Arm, Right Updated: 09/27/22 0251     hs TnI 0hr <2 ng/L     Comprehensive metabolic panel [[id number]] Collected: 09/27/22 0158    Lab Status: Final result Specimen: Blood from Arm, Right Updated: 09/27/22 0235     Sodium 139 mmol/L      Potassium 3 7 mmol/L      Chloride 105 mmol/L      CO2 26 mmol/L      ANION GAP 8 mmol/L      BUN 17 mg/dL      Creatinine 0 77 mg/dL      Glucose 131 mg/dL      Calcium 9 7 mg/dL      AST 27 U/L      ALT 47 U/L      Alkaline Phosphatase 44 U/L      Total Protein 7 2 g/dL      Albumin 4 8 g/dL      Total Bilirubin 0 62 mg/dL      eGFR 108 ml/min/1 73sq m     Narrative:      Víctor guidelines for Chronic Kidney Disease (CKD):     Stage 1 with normal or high GFR (GFR > 90 mL/min/1 73 square meters)    Stage 2 Mild CKD (GFR = 60-89 mL/min/1 73 square meters)    Stage 3A Moderate CKD (GFR = 45-59 mL/min/1 73 square meters)    Stage 3B Moderate CKD (GFR = 30-44 mL/min/1 73 square meters)    Stage 4 Severe CKD (GFR = 15-29 mL/min/1 73 square meters)    Stage 5 End Stage CKD (GFR <15 mL/min/1 73 square meters)  Note: GFR calculation is accurate only with a steady state creatinine    CBC and differential [[id number]]  (Abnormal) Collected: 09/27/22 0158    Lab Status: Final result Specimen: Blood from Arm, Right Updated: 09/27/22 0212     WBC 7 28 Thousand/uL      RBC 4 54 Million/uL      Hemoglobin 13 8 g/dL      Hematocrit 40 6 %      MCV 89 fL      MCH 30 4 pg      MCHC 34 0 g/dL      RDW 12 8 %      MPV 10 6 fL      Platelets 068 Thousands/uL      nRBC 0 /100 WBCs      Neutrophils Relative 44 %      Immat GRANS % 1 %      Lymphocytes Relative 46 %      Monocytes Relative 7 %      Eosinophils Relative 2 %      Basophils Relative 0 %      Neutrophils Absolute 3 21 Thousands/µL      Immature Grans Absolute 0 04 Thousand/uL      Lymphocytes Absolute 3 34 Thousands/µL      Monocytes Absolute 0 51 Thousand/µL      Eosinophils Absolute 0 15 Thousand/µL      Basophils Absolute 0 03 Thousands/µL                  XR chest 1 view portable   ED Interpretation by Saurabh Lujan (09/27 5584)   No acute cardiopulmonary disease identified  Procedures  ECG 12 Lead Documentation Only    Date/Time: 9/27/2022 4:36 AM  Performed by: Andrei Wilde [facility]  Authorized by:  GIUSEPPE Wilde     Indications / Diagnosis:  CP  ECG reviewed by me, the ED Provider: yes    Patient location:  ED  Previous ECG:     Previous ECG:  Unavailable    Comparison to cardiac monitor: Yes    Interpretation:     Interpretation: non-specific    Rate:     ECG rate:  68    ECG rate assessment: normal    Rhythm:     Rhythm: sinus rhythm    Comments:      Sinus rhythm, rightward axis, normal intervals, incomplete right bundle-branch block, no ST elevation, nonspecific T-wave changes             ED Course  ED Course as of 09/27/22 0603   Tue Sep 27, 2022   0257 First nurse orders placed prior to my evaluation   0257 CBC and differential(!)  No leukocytosis, no anemia   0257 Comprehensive metabolic panel  No metabolic abnormality, no SHELDON, no transaminitis   0334 hs TnI 0hr: <2  Heart score of 4  CP constant x1 week  Repeat troponins cancelled d/t protocol  0522 hs TnI 0hr: <2  Negative delta troponin   0545 Pt admitted to [facility] for heart score of 4 and concern of unstable angina given c/o chest pressure and pain with exertion and also at rest with new sx of GOSS  Pt agreeable to plan and has no questions  HEART Risk Score    Flowsheet Row Most Recent Value   Heart Score Risk Calculator    History 0 Filed at: 09/27/2022 0333   ECG 1 Filed at: 09/27/2022 0872   Age 1 Filed at: 09/27/2022 0333   Risk Factors 2 Filed at: 09/27/2022 0333   Troponin 0 Filed at: 09/27/2022 0333   HEART Score 4 Filed at: 09/27/2022 2374                PERC Rule for PE    Flowsheet Row Most Recent Value   PERC Rule for PE    Age >=50 0 Filed at: 09/27/2022 0323   HR >=100 0 Filed at: 09/27/2022 0323   O2 Sat on room air < 95% 0 Filed at: 09/27/2022 9598   History of PE or DVT 0 Filed at: 09/27/2022 4990   Recent trauma or surgery 0 Filed at: 09/27/2022 0323   Hemoptysis 0 Filed at: 09/27/2022 2731   Exogenous estrogen 0 Filed at: 09/27/2022 0323   Unilateral leg swelling 0 Filed at: 09/27/2022 0323   PERC Rule for PE Results 0 Filed at: 09/27/2022 2140              SBIRT 22yo+    Flowsheet Row Most Recent Value   SBIRT (25 yo +)    In order to provide better care to our patients, we are screening all of our patients for alcohol and drug use  Would it be okay to ask you these screening questions? Yes Filed at: 09/27/2022 0156   Initial Alcohol Screen: US AUDIT-C     1  How often do you have a drink containing alcohol?  1 Filed at: 09/27/2022 0156   2  How many drinks containing alcohol do you have on a typical day you are drinking? 0 Filed at: 09/27/2022 0156   3a  Male UNDER 65: How often do you have five or more drinks on one occasion? 0 Filed at: 09/27/2022 0156   Audit-C Score 1 Filed at: 09/27/2022 7332   MICHELLE: How many times in the past year have you    Used an illegal drug or used a prescription medication for non-medical reasons? Never Filed at: 09/27/2022 0395          Wells' Criteria for PE    Flowsheet Row Most Recent Value   Wells' Criteria for PE    Clinical signs and symptoms of DVT 0 Filed at: 09/27/2022 4915   PE is primary diagnosis or equally likely 0 Filed at: 09/27/2022 0323   HR >100 0 Filed at: 09/27/2022 0323   Immobilization at least 3 days or Surgery in the previous 4 weeks 0 Filed at: 09/27/2022 5500   Previous, objectively diagnosed PE or DVT 0 Filed at: 09/27/2022 0323   Hemoptysis 0 Filed at: 09/27/2022 6812   Malignancy with treatment within 6 months or palliative 0 Filed at: 09/27/2022 9394   Wells' Criteria Total 0 Filed at: 09/27/2022 6003                MDM  Number of Diagnoses or Management Options  Atypical chest pain: new and requires workup  SOB (shortness of breath): new and requires workup  Diagnosis management comments: DDX including but not limited to: ACS, MI, PE, PTX, pneumonia, dissection, pleurisy, pericarditis, myocarditis, GI etiology          Amount and/or Complexity of Data Reviewed  Clinical lab tests: ordered and reviewed  Tests in the radiology section of CPT®: ordered and reviewed  Decide to obtain previous medical records or to obtain history from someone other than the patient: yes  Review and summarize past medical records: yes  Discuss the patient with other providers: yes (Dr Ines Mahajan)  Independent visualization of images, tracings, or specimens: yes    Risk of Complications, Morbidity, and/or Mortality  General comments: Assessment:  Patient is a 79-year-old male with PMH of HTN, HLD, and morbid obesity presenting for evaluation of chest pain associated with shortness of breath and ringing in the ears  His vital signs remained stable while observed in the ER  His troponin and delta troponin were negative, however he has an abnormal EKG and a heart score is 4  I had my attending Dr Brian Schroeder also evaluate the patient and make recommendations on the pt case  Dr Brian Schroeder interviewed the pt and has concern for unstable angina  Given pt RFs and clinical data, plan made to admit for tele obs for ongoing cardiac monitoring and possible cardiology evaluation  He was ambulatory with steady gait and maintain his pulse ox and had no elevation heart rate  He did not experience chest pain or shortness of breath on the walk throughout the ED  His Wells and PERC rule were negative making PE less likely  His chest x-ray was without noted focal consolidation  The remainder was well work was grossly unremarkable  Patient Progress  Patient progress: stable      Disposition  Final diagnoses:   Atypical chest pain   SOB (shortness of breath)     Time reflects when diagnosis was documented in both MDM as applicable and the Disposition within this note     Time User Action Codes Description Comment    9/27/2022  5:37 AM Leonardo Garsia Add [R07 89] Atypical chest pain     9/27/2022  5:37 AM Timo Rooney Add [R06 02] SOB (shortness of breath)       ED Disposition     ED Disposition   Admit    Condition   Stable    Date/Time   Tue Sep 27, 2022  5:37 AM    Comment   Case was discussed with Maylene Hodgkin and the patient's admission status was agreed to be Admission Status: observation status to the service of Dr Mayra Szymanski   Follow-up Information    None         Patient's Medications   Discharge Prescriptions    No medications on file       No discharge procedures on file      PDMP Review     None          ED Provider  Electronically Signed by           Andrei Rosales  09/27/22 5536

## 2022-09-27 NOTE — CONSULTS
Consultation - Cardiology Team One  [facility] 56 y o  male MRN: [id number]  Unit/Bed#: ED-38 Encounter: [id number]    Inpatient consult to Cardiology  Consult performed by: GIUSEPPE Patricia  Consult ordered by: Carole Calvo MD      Physician Requesting Consult: Radha Arroyo MD  Reason for Consult / Principal Problem: chest pain    Assessment/ Plan    1  Atypical chest pain, SOB  Chest pressure occurs at random, often with rest  Not positional or reproducible  Patient feels may be related to stress  More recently developed exertional SOB   Troponin <2 x 3  ECG- NSR with nonspecific T wave abnormality, seen on prior ECGs  TTE 12/2020-normal LV function with mild concentric left ventricular hypertrophy, normal pulmonary artery pressures, mild TR  Lexiscan Myoview stress test 02/2019- no perfusion defects, ECG negative for ischemia  Supposed to have outpatient exercise nuclear stress test on 09/30/2022  Nuclear stress unable to be completed today, will see if stress echo can be completed  2  HTN- average /74  On losartan-HCTZ 100-12 5 mg daily  3  HLD- total chol 153, triglycerides 201, HDL 39, LDL 74  On atorvastatin 20 mg and fenofibrate  4  Pre diabetes- A1c 6 0%  5  Family history of premature CAD- mother had MI in her 45s    History of Present Illness   HPI: [facility] is a 56y o  year old male with HTN, HLD, anxiety, pre diabetes, and a family history of premature CAD who presented to the ED with chest pain and worsening exertional dyspnea on 9/27/22  HS troponin negative <2 x 3  Rest of labs are unremarkable  BP mildly elevated on arrival, 156/79  ECG shows NSR with incomplete RBBB and nonspecific T-wave abnormality  Cardiology consulted for further evaluation and management of his chest pain  He lives at home with his wife and 6year-old daughter  He works as a  at [employer]    His wife was diagnosed with MDS in March and he is dealing with the stress of that as well as working in a high stress job  He is a lifelong nonsmoker and denies any drug or alcohol use  His mother had a stroke and heart attack in her 45s  His paternal grandfather had a heart attack in his 62s  He has been having exertional shortness of breath over the past 2 weeks but is also occasionally occurring at rest   He also notes chest pressure/tightness occurs randomly  It is not better or worse with position change her activity level  It often occurs at rest   He felt this might be related to the stress in her life by his  for the family history  The episode last night involve chest tightness, shortness of breath and a ringing in his ears  EKG reviewed personally: NSR with incomplete RBBB, nonspecific T wave abnormality    Telemetry reviewed personally: NSR, sinus bradycardia    Review of Systems   Constitutional: Negative for chills, diaphoresis, fever, malaise/fatigue and weight gain  Cardiovascular: Positive for chest pain and dyspnea on exertion  Negative for leg swelling, orthopnea, palpitations and syncope  Respiratory: Negative for cough, shortness of breath, sleep disturbances due to breathing and sputum production  Gastrointestinal: Negative for bloating, abdominal pain, nausea and vomiting  Neurological: Negative for dizziness, light-headedness and weakness  Psychiatric/Behavioral: Negative for altered mental status  All other systems reviewed and are negative      Historical Information   Past Medical History:   Diagnosis Date    Hernia, umbilical     Hyperlipidemia     Hypertension     Spinal stenosis      Past Surgical History:   Procedure Laterality Date    ANTERIOR CRUCIATE LIGAMENT REPAIR  1994    BASAL CELL CARCINOMA EXCISION  2019    HERNIA REPAIR  1997    Prp I/real init reduc >5 yr   Izabella Allison KIDNEY STONE SURGERY  2015    KNEE SURGERY      x2     Social History     Substance and Sexual Activity   Alcohol Use No     Social History     Substance and Sexual Activity   Drug Use No     Social History     Tobacco Use   Smoking Status Never Smoker   Smokeless Tobacco Never Used     Family History:   Family History   Problem Relation Age of Onset    Hyperlipidemia Mother     Hypertension Mother         essential     Diabetes Paternal Grandfather        Meds/Allergies   all current active meds have been reviewed and current meds:   Current Facility-Administered Medications   Medication Dose Route Frequency    acetaminophen (TYLENOL) tablet 650 mg  650 mg Oral Q4H PRN    ALPRAZolam (XANAX) tablet 0 5 mg  0 5 mg Oral HS PRN    atorvastatin (LIPITOR) tablet 20 mg  20 mg Oral QPM    busPIRone (BUSPAR) tablet 7 5 mg  7 5 mg Oral BID    fenofibrate (TRICOR) tablet 48 mg  48 mg Oral Daily    loratadine (CLARITIN) tablet 10 mg  10 mg Oral Daily    losartan potassium-hydrochlorothiazide (HYZAAR 100/12  5) combo dose   Oral Daily        Allergies   Allergen Reactions    Pollen Extract     Codeine Palpitations       Objective   Vitals: Blood pressure 131/68, pulse 64, temperature 97 5 °F (36 4 °C), temperature source Oral, resp  rate 18, SpO2 100 %  , There is no height or weight on file to calculate BMI ,     Systolic (10NYK), ZJE:348 , Min:131 , UYE:719     Diastolic (23BEV), PIW:24, Min:68, Max:79      No intake or output data in the 24 hours ending 09/27/22 1021  Wt Readings from Last 3 Encounters:   03/02/20 133 kg (294 lb)   07/07/19 130 kg (286 lb 3 2 oz)   04/02/19 127 kg (280 lb 6 8 oz)     Invasive Devices  Report    Peripheral Intravenous Line  Duration           Peripheral IV 09/27/22 Right;Ventral (anterior) Forearm <1 day              Physical Exam  Vitals reviewed  Constitutional:       General: He is not in acute distress  Appearance: He is obese  Neck:      Vascular: No hepatojugular reflux or JVD  Cardiovascular:      Rate and Rhythm: Normal rate and regular rhythm  Pulses: Normal pulses  Heart sounds: Normal heart sounds   No murmur heard  No friction rub  No gallop  Pulmonary:      Effort: Pulmonary effort is normal  No respiratory distress  Breath sounds: Normal breath sounds  No rales  Comments: Room air  Abdominal:      General: Bowel sounds are normal  There is no distension  Palpations: Abdomen is soft  Tenderness: There is no abdominal tenderness  Musculoskeletal:         General: No tenderness  Normal range of motion  Cervical back: Neck supple  Right lower leg: No edema  Left lower leg: No edema  Skin:     General: Skin is warm and dry  Capillary Refill: Capillary refill takes less than 2 seconds  Findings: No erythema  Neurological:      Mental Status: He is alert and oriented to person, place, and time     Psychiatric:         Mood and Affect: Mood normal          LABORATORY RESULTS:      CBC with diff:   Results from last 7 days   Lab Units 09/27/22  0158   WBC Thousand/uL 7 28   HEMOGLOBIN g/dL 13 8   HEMATOCRIT % 40 6   MCV fL 89   PLATELETS Thousands/uL 192   MCH pg 30 4   MCHC g/dL 34 0   RDW % 12 8   MPV fL 10 6   NRBC AUTO /100 WBCs 0     CMP:  Results from last 7 days   Lab Units 09/27/22  0158   POTASSIUM mmol/L 3 7   CHLORIDE mmol/L 105   CO2 mmol/L 26   BUN mg/dL 17   CREATININE mg/dL 0 77   CALCIUM mg/dL 9 7   AST U/L 27   ALT U/L 47   ALK PHOS U/L 44   EGFR ml/min/1 73sq m 108     BMP:  Results from last 7 days   Lab Units 09/27/22  0158   POTASSIUM mmol/L 3 7   CHLORIDE mmol/L 105   CO2 mmol/L 26   BUN mg/dL 17   CREATININE mg/dL 0 77   CALCIUM mg/dL 9 7     Lab Results   Component Value Date    CREATININE 0 77 09/27/2022    CREATININE 0 76 03/02/2020    CREATININE 1 37 (H) 07/08/2019     Lab Results   Component Value Date    NTBNP 25 02/23/2019       Lipid Profile:   No results found for: CHOL  Lab Results   Component Value Date    HDL 27 (L) 02/24/2019     Lab Results   Component Value Date    LDLCALC 79 02/24/2019     Lab Results   Component Value Date TRIG 392 (H) 02/24/2019     Imaging: I have personally reviewed pertinent reports  and I have personally reviewed pertinent films in PACS    Counseling / Coordination of Care  Total floor / unit time spent today 45 minutes  Greater than 50% of total time was spent with the patient and / or family counseling and / or coordination of care  A description of the counseling / coordination of care: Review of history, current assessment, development of a plan  Code Status: Level 1 - Full Code    ** Please Note: Dragon 360 Dictation voice to text software may have been used in the creation of this document   **

## 2022-09-27 NOTE — H&P
[address] [facility] 1975, 56 y o  male MRN: [id number]  Unit/Bed#: ED-38 Encounter: [id number]  Primary Care Provider: Denisse Harris MD   Date and time admitted to hospital: 2022  1:39 AM    * Atypical chest pain  Assessment & Plan  · POA with chest tightness on exertion and at rest, shortness of breath on mild exertion, palpitations, ringing in the ear (new symptom starting yesterday)  Denies acute, radiant, reproduce chest pain, fever, chills, nausea, vomiting  · Risk factors:  Hyperlipidemia, hypertension, obesity, anxiety  · Family history mother  as result of heart attack  · Patient scheduled for stress test on upcoming Friday  · Last stress test showed  no chest pain during stress  ECG was negative for ischemia and normal  Arrhythmia during stress: isolated premature ventricular beats  Currently not following cardiology  · On admission patient stable in terms of vital signs  Noted slight elevation of /79  · PROSPER score 1  · EKG:  Heart rate 67,  Incomplete right bundle-branch block, no ST segment abnormality  · Troponin 0 H < 2  DDx:  Unstable angina vs anxiety    Plan:  Monitor on tele  Cardiology consult in place to consider inpatient stress test       Shortness of breath  Assessment & Plan  Patient with complain of shortness of breath on mild exertion,  noted to be present in the last week as well as chest pain  Denies lower extremity edema, orthopnea  Has history of FIOR, allergic rhinitis (patient mentioned having stuffy nose)  Denies cough, fever, chills  EKG unremarkable  Cardiology consulted   Can be considered echo to rule out cardiac origin of the shortness of breath  Dyslipidemia  Assessment & Plan  Continue Lipitor 40 mg  Fenofibrate 48 mg    Essential hypertension  Assessment & Plan  Continue home dose Hyzaar     VTE Pharmacologic Prophylaxis: VTE Score: 2 Low Risk (Score 0-2) - Encourage Ambulation    Code Status: Level 1 - Full Code patient reported  Discussion with family: Will defer to daytime team to contact the family  Anticipated Length of Stay: Patient will be admitted on an observation basis with an anticipated length of stay of less than 2 midnights secondary to Chest tightness and shortness of breath  Chief Complaint:  Chest tightness and shortness of breath on exertion    History of Present Illness:  Sherald Peabody is a 56 y o  male with a PMH of hypertension, hyperlipidemia, anxiety, FIOR, allergic rhinitis, obesity who presents with atypical chest pain described as tightness that would be present on exertion as well as rest that has been persistent for a while  Patient also endorses shortness of breath on mild exertion that was worsening over the past week  On assessment this morning patient also complained of ringing in his ear  Denies lower extremity edema and orthopnea, cough, fever, chills  Per chart review patient had a similar episode of shortness of breath and ear fullness in   Patient underwent stress test that did not show any wall motion abnormality and no cardiac cause of the dyspnea  Patient also with family history of mother  due to heart attack  Patient also endorses intermittent episodes of palpitations describing as heart is fluttering in his chest   On admission patient stable  Troponins and EKG not concerning for any heart injury  Patient was admitted for observation and close monitoring  Review of Systems:  Review of Systems   Constitutional: Negative for chills and fever  HENT: Negative for ear pain and sore throat  Left ear fullness   Eyes: Negative for pain and visual disturbance  Respiratory: Positive for chest tightness and shortness of breath  Negative for cough  Cardiovascular: Positive for palpitations  Negative for chest pain  Gastrointestinal: Negative for abdominal pain and vomiting  Genitourinary: Negative for dysuria and hematuria  Musculoskeletal: Negative for arthralgias and back pain  Skin: Negative for color change and rash  Neurological: Negative for seizures and syncope  All other systems reviewed and are negative  Past Medical and Surgical History:   Past Medical History:   Diagnosis Date    Hernia, umbilical     Hyperlipidemia     Hypertension     Spinal stenosis        Past Surgical History:   Procedure Laterality Date    ANTERIOR CRUCIATE LIGAMENT REPAIR  1994    BASAL CELL CARCINOMA EXCISION  2019    HERNIA REPAIR  1997    Prp I/real init reduc >5 yr    KIDNEY STONE SURGERY  2015    KNEE SURGERY      x2       Meds/Allergies:  Prior to Admission medications    Medication Sig Start Date End Date Taking?  Authorizing Provider   ALPRAZolam Torres Niece) 0 5 mg tablet Take by mouth daily at bedtime as needed for anxiety   Yes Historical Provider, MD   atorvastatin (LIPITOR) 40 mg tablet Take 0 5 tablets (20 mg total) by mouth every evening 2/24/19  Yes Dilcia Curry MD   busPIRone (BUSPAR) 7 5 mg tablet Take 7 5 mg by mouth 2 (two) times a day 2/27/19  Yes Historical Provider, MD   fenofibrate (TRICOR) 48 mg tablet Take by mouth daily Takes 160 mg once a day   Yes Historical Provider, MD   loratadine (CLARITIN) 10 mg tablet Take 10 mg by mouth daily   Yes Historical Provider, MD   losartan-hydrochlorothiazide (HYZAAR) 100-12 5 MG per tablet Take 1 tablet by mouth daily   Yes Historical Provider, MD   multivitamin (THERAGRAN) TABS Take 1 tablet by mouth   Yes Historical Provider, MD   omega-3-acid ethyl esters (LOVAZA) 1 g capsule Take 2 g by mouth 2 (two) times a day   Yes Historical Provider, MD   acetaminophen (TYLENOL) 325 mg tablet Take 2 tablets (650 mg total) by mouth every 4 (four) hours as needed for mild pain or headaches  Patient not taking: Reported on 9/27/2022 2/24/19   Dilcia Curry MD   azelastine (ASTELIN) 0 1 % nasal spray 1 spray into each nostril 2 (two) times a day Use in each nostril as directed Historical Provider, MD   losartan (COZAAR) 50 mg tablet Take 100 mg by mouth daily   Patient not taking: Reported on 9/27/2022 9/27/22  Historical Provider, MD   tamsulosin (FLOMAX) 0 4 mg Take 1 capsule (0 4 mg total) by mouth daily with dinner for 10 days 7/8/19 9/27/22  Tony Barahona PA-C     I have reviewed home medications with patient personally  Allergies: Allergies   Allergen Reactions    Pollen Extract     Codeine Palpitations       Social History:  Marital Status: /Civil Union   Occupation:   at the [address] [place] [address]  Patient Pre-hospital Living Situation: Home  Patient Pre-hospital Level of Mobility: walks  Patient Pre-hospital Diet Restrictions:   Substance Use History:   Social History     Substance and Sexual Activity   Alcohol Use No     Social History     Tobacco Use   Smoking Status Never Smoker   Smokeless Tobacco Never Used     Social History     Substance and Sexual Activity   Drug Use No       Family History:  Family History   Problem Relation Age of Onset    Hyperlipidemia Mother     Hypertension Mother         essential     Diabetes Paternal Grandfather        Physical Exam:     Vitals:   Blood Pressure: 147/75 (09/27/22 0426)  Pulse: 61 (09/27/22 0426)  Temperature: 97 5 °F (36 4 °C) (09/26/22 2326)  Temp Source: Oral (09/26/22 2326)  Respirations: 16 (09/27/22 0426)  SpO2: 99 % (09/27/22 0426)    Physical Exam  Vitals and nursing note reviewed  Constitutional:       Appearance: He is well-developed  He is obese  HENT:      Head: Normocephalic and atraumatic  Eyes:      Conjunctiva/sclera: Conjunctivae normal    Cardiovascular:      Rate and Rhythm: Normal rate and regular rhythm  Heart sounds: No murmur heard  Pulmonary:      Effort: Pulmonary effort is normal  No respiratory distress  Breath sounds: Normal breath sounds  Abdominal:      General: Bowel sounds are normal       Palpations: Abdomen is soft  Tenderness:  There is no abdominal tenderness  Musculoskeletal:      Cervical back: Neck supple  Right lower leg: No edema  Left lower leg: No edema  Skin:     General: Skin is warm and dry  Neurological:      Mental Status: He is alert  Additional Data:     Lab Results:  Results from last 7 days   Lab Units 09/27/22  0158   WBC Thousand/uL 7 28   HEMOGLOBIN g/dL 13 8   HEMATOCRIT % 40 6   PLATELETS Thousands/uL 192   NEUTROS PCT % 44   LYMPHS PCT % 46*   MONOS PCT % 7   EOS PCT % 2     Results from last 7 days   Lab Units 09/27/22  0158   SODIUM mmol/L 139   POTASSIUM mmol/L 3 7   CHLORIDE mmol/L 105   CO2 mmol/L 26   BUN mg/dL 17   CREATININE mg/dL 0 77   ANION GAP mmol/L 8   CALCIUM mg/dL 9 7   ALBUMIN g/dL 4 8   TOTAL BILIRUBIN mg/dL 0 62   ALK PHOS U/L 44   ALT U/L 47   AST U/L 27   GLUCOSE RANDOM mg/dL 131                       Imaging: Reviewed radiology reports from this admission including: chest xray  XR chest 1 view portable   ED Interpretation by Saurabh Lujan (09/27 0148)   No acute cardiopulmonary disease identified  EKG and Other Studies Reviewed on Admission:   · EKG: NSR  HR 67, incomplete right bundle branch block  ** Please Note: This note has been constructed using a voice recognition system   **

## 2022-09-27 NOTE — ASSESSMENT & PLAN NOTE
· POA with chest tightness on exertion and at rest, shortness of breath on mild exertion, palpitations, ringing in the ear (new symptom starting yesterday)  Denies acute, radiant, reproduce chest pain, fever, chills, nausea, vomiting  · Risk factors:  Hyperlipidemia, hypertension, obesity, anxiety  · Family history mother  as result of heart attack  · Patient scheduled for stress test on upcoming Friday  · Last stress test showed  no chest pain during stress  ECG was negative for ischemia and normal  Arrhythmia during stress: isolated premature ventricular beats  Currently not following cardiology  · On admission patient stable in terms of vital signs    Noted slight elevation of /79  · PROSPER score 1  · EKG:  Heart rate 67,  Incomplete right bundle-branch block, no ST segment abnormality  · Troponin 0 H < 2  DDx:  Unstable angina vs anxiety    Plan:  Monitor on tele  Cardiology consult in place to consider inpatient stress test